# Patient Record
Sex: FEMALE | Race: BLACK OR AFRICAN AMERICAN
[De-identification: names, ages, dates, MRNs, and addresses within clinical notes are randomized per-mention and may not be internally consistent; named-entity substitution may affect disease eponyms.]

---

## 2015-08-21 VITALS — SYSTOLIC BLOOD PRESSURE: 136 MMHG | DIASTOLIC BLOOD PRESSURE: 51 MMHG

## 2017-01-13 ENCOUNTER — HOSPITAL ENCOUNTER (OUTPATIENT)
Dept: HOSPITAL 61 - MAMMO | Age: 52
Discharge: HOME | End: 2017-01-13
Attending: FAMILY MEDICINE
Payer: COMMERCIAL

## 2017-01-13 DIAGNOSIS — Z12.31: Primary | ICD-10-CM

## 2017-01-13 PROCEDURE — G0202 SCR MAMMO BI INCL CAD: HCPCS

## 2017-01-13 PROCEDURE — 77052: CPT

## 2017-01-13 NOTE — RAD
EXAM: DIGITAL SCREEN BILAT W/CAD.



HISTORY: Screening.



COMPARISON: 12/15/2008.



FINDINGS:



Digital mammography was performed. Computer-aided detection (CAD) was

utilized.



The breast parenchyma demonstrates scattered fibroglandular densities (tissue

density B). No dominant suspicious mass, suspicious microcalcifications, or

architectural distortion is identified.



Scattered, benign-appearing calcifications are present in both breasts.



IMPRESSION: No mammographic evidence of malignancy.







BI-RADS CATEGORY: 2 BENIGN FINDING(S)



RECOMMENDED FOLLOW-UP: 12M 12 MONTH FOLLOW-UP



PQRS compliance statement: Patient information was entered into a reminder

system with a target due date     for the next mammogram.



Mammography is a sensitive method for finding small breast cancers, but it

does not detect them all and is not a substitute for careful clinical

examination.  A negative mammogram does not negate a clinically suspicious

finding and should not result in delay in biopsying a clinically suspicious

abnormality.



"Our facility is accredited by the [employer] Mammography

Program."

## 2018-02-20 ENCOUNTER — HOSPITAL ENCOUNTER (EMERGENCY)
Dept: HOSPITAL 61 - ER | Age: 53
Discharge: HOME | End: 2018-02-20
Payer: COMMERCIAL
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
## 2018-02-20 DIAGNOSIS — S46.912A: Primary | ICD-10-CM

## 2018-02-20 DIAGNOSIS — I10: ICD-10-CM
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
## 2018-02-20 DIAGNOSIS — F17.200: ICD-10-CM

## 2018-02-20 DIAGNOSIS — Z96.649: ICD-10-CM

## 2018-02-20 PROCEDURE — 99283 EMERGENCY DEPT VISIT LOW MDM: CPT

## 2019-04-09 ENCOUNTER — HOSPITAL ENCOUNTER (INPATIENT)
Dept: HOSPITAL 61 - ER | Age: 54
LOS: 2 days | Discharge: HOME | DRG: 304 | End: 2019-04-11
Attending: INTERNAL MEDICINE | Admitting: INTERNAL MEDICINE
Payer: COMMERCIAL

## 2019-04-09 VITALS — DIASTOLIC BLOOD PRESSURE: 60 MMHG | SYSTOLIC BLOOD PRESSURE: 159 MMHG

## 2019-04-09 VITALS — SYSTOLIC BLOOD PRESSURE: 150 MMHG | DIASTOLIC BLOOD PRESSURE: 66 MMHG

## 2019-04-09 VITALS — DIASTOLIC BLOOD PRESSURE: 76 MMHG | SYSTOLIC BLOOD PRESSURE: 164 MMHG

## 2019-04-09 VITALS — SYSTOLIC BLOOD PRESSURE: 187 MMHG | DIASTOLIC BLOOD PRESSURE: 81 MMHG

## 2019-04-09 VITALS — BODY MASS INDEX: 39.72 KG/M2 | HEIGHT: 65 IN | WEIGHT: 238.38 LBS

## 2019-04-09 VITALS — DIASTOLIC BLOOD PRESSURE: 73 MMHG | SYSTOLIC BLOOD PRESSURE: 174 MMHG

## 2019-04-09 VITALS — DIASTOLIC BLOOD PRESSURE: 60 MMHG | SYSTOLIC BLOOD PRESSURE: 158 MMHG

## 2019-04-09 VITALS — SYSTOLIC BLOOD PRESSURE: 149 MMHG | DIASTOLIC BLOOD PRESSURE: 58 MMHG

## 2019-04-09 VITALS — DIASTOLIC BLOOD PRESSURE: 72 MMHG | SYSTOLIC BLOOD PRESSURE: 173 MMHG

## 2019-04-09 VITALS — DIASTOLIC BLOOD PRESSURE: 52 MMHG | SYSTOLIC BLOOD PRESSURE: 152 MMHG

## 2019-04-09 DIAGNOSIS — I27.20: ICD-10-CM

## 2019-04-09 DIAGNOSIS — Z82.49: ICD-10-CM

## 2019-04-09 DIAGNOSIS — Z96.641: ICD-10-CM

## 2019-04-09 DIAGNOSIS — E66.9: ICD-10-CM

## 2019-04-09 DIAGNOSIS — M54.5: ICD-10-CM

## 2019-04-09 DIAGNOSIS — Z91.19: ICD-10-CM

## 2019-04-09 DIAGNOSIS — Z91.14: ICD-10-CM

## 2019-04-09 DIAGNOSIS — I24.8: ICD-10-CM

## 2019-04-09 DIAGNOSIS — Z87.11: ICD-10-CM

## 2019-04-09 DIAGNOSIS — I11.0: ICD-10-CM

## 2019-04-09 DIAGNOSIS — Z98.1: ICD-10-CM

## 2019-04-09 DIAGNOSIS — F17.210: ICD-10-CM

## 2019-04-09 DIAGNOSIS — M19.90: ICD-10-CM

## 2019-04-09 DIAGNOSIS — E78.5: ICD-10-CM

## 2019-04-09 DIAGNOSIS — K21.9: ICD-10-CM

## 2019-04-09 DIAGNOSIS — I50.33: ICD-10-CM

## 2019-04-09 DIAGNOSIS — G89.29: ICD-10-CM

## 2019-04-09 DIAGNOSIS — Z83.3: ICD-10-CM

## 2019-04-09 DIAGNOSIS — I16.1: Primary | ICD-10-CM

## 2019-04-09 LAB
ALBUMIN SERPL-MCNC: 3.2 G/DL (ref 3.4–5)
ALBUMIN/GLOB SERPL: 0.9 {RATIO} (ref 1–1.7)
ALP SERPL-CCNC: 99 U/L (ref 46–116)
ALT SERPL-CCNC: 47 U/L (ref 14–59)
AMPHETAMINE/METHAMPHETAMINE: (no result)
ANION GAP SERPL CALC-SCNC: 9 MMOL/L (ref 6–14)
APTT PPP: YELLOW S
AST SERPL-CCNC: 27 U/L (ref 15–37)
BACTERIA #/AREA URNS HPF: 0 /HPF
BARBITURATES UR-MCNC: (no result) UG/ML
BASOPHILS # BLD AUTO: 0.1 X10^3/UL (ref 0–0.2)
BASOPHILS NFR BLD: 1 % (ref 0–3)
BENZODIAZ UR-MCNC: (no result) UG/L
BILIRUB SERPL-MCNC: 0.8 MG/DL (ref 0.2–1)
BILIRUB UR QL STRIP: NEGATIVE
BUN SERPL-MCNC: 17 MG/DL (ref 7–20)
BUN/CREAT SERPL: 15 (ref 6–20)
CALCIUM SERPL-MCNC: 8.8 MG/DL (ref 8.5–10.1)
CANNABINOIDS UR-MCNC: (no result) UG/L
CHLORIDE SERPL-SCNC: 104 MMOL/L (ref 98–107)
CHOLEST SERPL-MCNC: 164 MG/DL (ref 0–200)
CHOLEST/HDLC SERPL: 3.3 {RATIO}
CK SERPL-CCNC: 214 U/L (ref 26–192)
CO2 SERPL-SCNC: 28 MMOL/L (ref 21–32)
COCAINE UR-MCNC: (no result) NG/ML
CREAT SERPL-MCNC: 1.1 MG/DL (ref 0.6–1)
EOSINOPHIL NFR BLD: 0.3 X10^3/UL (ref 0–0.7)
EOSINOPHIL NFR BLD: 5 % (ref 0–3)
ERYTHROCYTE [DISTWIDTH] IN BLOOD BY AUTOMATED COUNT: 14.7 % (ref 11.5–14.5)
FIBRINOGEN PPP-MCNC: CLEAR MG/DL
GFR SERPLBLD BASED ON 1.73 SQ M-ARVRAT: 62.6 ML/MIN
GLOBULIN SER-MCNC: 3.7 G/DL (ref 2.2–3.8)
GLUCOSE SERPL-MCNC: 100 MG/DL (ref 70–99)
HCT VFR BLD CALC: 43.3 % (ref 36–47)
HDLC SERPL-MCNC: 49 MG/DL (ref 40–60)
HGB BLD-MCNC: 14.3 G/DL (ref 12–15.5)
LDLC: 106 MG/DL (ref 0–100)
LYMPHOCYTES # BLD: 1.3 X10^3/UL (ref 1–4.8)
LYMPHOCYTES NFR BLD AUTO: 18 % (ref 24–48)
MCH RBC QN AUTO: 31 PG (ref 25–35)
MCHC RBC AUTO-ENTMCNC: 33 G/DL (ref 31–37)
MCV RBC AUTO: 93 FL (ref 79–100)
METHADONE SERPL-MCNC: (no result) NG/ML
MONO #: 0.7 X10^3/UL (ref 0–1.1)
MONOCYTES NFR BLD: 10 % (ref 0–9)
NEUT #: 4.6 X10^3UL (ref 1.8–7.7)
NEUTROPHILS NFR BLD AUTO: 66 % (ref 31–73)
NITRITE UR QL STRIP: NEGATIVE
OPIATES UR-MCNC: (no result) NG/ML
PCP SERPL-MCNC: (no result) MG/DL
PH UR STRIP: 6.5 [PH]
PLATELET # BLD AUTO: 184 X10^3/UL (ref 140–400)
POTASSIUM SERPL-SCNC: 4 MMOL/L (ref 3.5–5.1)
PROT SERPL-MCNC: 6.9 G/DL (ref 6.4–8.2)
PROT UR STRIP-MCNC: NEGATIVE MG/DL
PROTHROMBIN TIME: 12.9 SEC (ref 11.7–14)
RBC # BLD AUTO: 4.64 X10^6/UL (ref 3.5–5.4)
RBC #/AREA URNS HPF: (no result) /HPF (ref 0–2)
SODIUM SERPL-SCNC: 141 MMOL/L (ref 136–145)
SQUAMOUS #/AREA URNS LPF: (no result) /LPF
TRIGL SERPL-MCNC: 46 MG/DL (ref 0–150)
UROBILINOGEN UR-MCNC: 0.2 MG/DL
VLDLC: 9 MG/DL (ref 0–40)
WBC # BLD AUTO: 6.9 X10^3/UL (ref 4–11)
WBC #/AREA URNS HPF: (no result) /HPF (ref 0–4)

## 2019-04-09 PROCEDURE — 94640 AIRWAY INHALATION TREATMENT: CPT

## 2019-04-09 PROCEDURE — 80307 DRUG TEST PRSMV CHEM ANLYZR: CPT

## 2019-04-09 PROCEDURE — 80048 BASIC METABOLIC PNL TOTAL CA: CPT

## 2019-04-09 PROCEDURE — 84484 ASSAY OF TROPONIN QUANT: CPT

## 2019-04-09 PROCEDURE — 71045 X-RAY EXAM CHEST 1 VIEW: CPT

## 2019-04-09 PROCEDURE — 85610 PROTHROMBIN TIME: CPT

## 2019-04-09 PROCEDURE — 85025 COMPLETE CBC W/AUTO DIFF WBC: CPT

## 2019-04-09 PROCEDURE — 76770 US EXAM ABDO BACK WALL COMP: CPT

## 2019-04-09 PROCEDURE — 85379 FIBRIN DEGRADATION QUANT: CPT

## 2019-04-09 PROCEDURE — 93306 TTE W/DOPPLER COMPLETE: CPT

## 2019-04-09 PROCEDURE — 80053 COMPREHEN METABOLIC PANEL: CPT

## 2019-04-09 PROCEDURE — 83605 ASSAY OF LACTIC ACID: CPT

## 2019-04-09 PROCEDURE — 96375 TX/PRO/DX INJ NEW DRUG ADDON: CPT

## 2019-04-09 PROCEDURE — 96374 THER/PROPH/DIAG INJ IV PUSH: CPT

## 2019-04-09 PROCEDURE — 87641 MR-STAPH DNA AMP PROBE: CPT

## 2019-04-09 PROCEDURE — 87040 BLOOD CULTURE FOR BACTERIA: CPT

## 2019-04-09 PROCEDURE — 81001 URINALYSIS AUTO W/SCOPE: CPT

## 2019-04-09 PROCEDURE — 96361 HYDRATE IV INFUSION ADD-ON: CPT

## 2019-04-09 PROCEDURE — 82550 ASSAY OF CK (CPK): CPT

## 2019-04-09 PROCEDURE — 83880 ASSAY OF NATRIURETIC PEPTIDE: CPT

## 2019-04-09 PROCEDURE — 96376 TX/PRO/DX INJ SAME DRUG ADON: CPT

## 2019-04-09 PROCEDURE — 80061 LIPID PANEL: CPT

## 2019-04-09 PROCEDURE — 36415 COLL VENOUS BLD VENIPUNCTURE: CPT

## 2019-04-09 PROCEDURE — 93005 ELECTROCARDIOGRAM TRACING: CPT

## 2019-04-09 PROCEDURE — 84443 ASSAY THYROID STIM HORMONE: CPT

## 2019-04-09 RX ADMIN — BACITRACIN PRN MLS/HR: 5000 INJECTION, POWDER, FOR SOLUTION INTRAMUSCULAR at 13:39

## 2019-04-09 RX ADMIN — BACITRACIN PRN MLS/HR: 5000 INJECTION, POWDER, FOR SOLUTION INTRAMUSCULAR at 18:15

## 2019-04-09 RX ADMIN — CHLORTHALIDONE SCH MG: 25 TABLET ORAL at 14:58

## 2019-04-09 RX ADMIN — ONDANSETRON PRN MG: 2 INJECTION INTRAMUSCULAR; INTRAVENOUS at 14:59

## 2019-04-09 RX ADMIN — ASPIRIN SCH MG: 325 TABLET, DELAYED RELEASE ORAL at 14:58

## 2019-04-09 RX ADMIN — BACITRACIN PRN MLS/HR: 5000 INJECTION, POWDER, FOR SOLUTION INTRAMUSCULAR at 19:29

## 2019-04-09 NOTE — PDOC1
History and Physical


Date of Admission


Date of Admission


DATE: 4/9/19 


TIME: 14:15





Identification/Chief Complaint


Chief Complaint


Headache, intermittent chest pain, SOA





Source


Source:  Caregiver, Chart review, Patient





History of Present Illness


History of Present Illness


43-year-old obese  female who does not see a PCP regularly, admits to 

not being compliant with her combo diuretic/ARB pill. Comes in because of 

intermittent SOA, left-sided chest pain and headache for about 2 weeks. Blood 

pressure is 240/130 on arrival. Give hydralazine but no resolve,  now on 

Cardene drip with blood pressure 200s. Mild troponin 0.153 with a BNP of 2166. 

Eyes are flushed, still looks uncomfortable headache and neck pain. We'll admit 

to ICU for hypertensive emergency. Seen at ER, dw family my plan.





SHe is not taking gabapentin anymore-she had that then she had neck surgery


She is not taking warfarin anymore-she was put on it after an orthopedic surgery





Past Medical History


Cardiovascular:  HTN, Hyperlipidemia


Pulmonary:  No pertinent hx


GI:  Peptic Ulcer disease


Heme/Onc:  No pertinent hx


Hepatobiliary:  No pertinent hx


Psych:  No pertinent hx


Musculoskeletal:  Osteoarthritis


Rheumatologic:  No pertinent hx


Infectious disease:  No pertinent hx


Renal/:  No pertinent hx


Endocrine:  No pertinent hx





Past Surgical History


Past Surgical History:  Other (neck sx)





Family History


Family History:  Diabetes, Heart Disease





Social History


Smoke:  <1 pack per day


ALCOHOL:  none


Drugs:  None





Current Problem List


Problem List


Problems


Medical Problems:


(1) CHF (congestive heart failure)


Status: Acute  





(2) Elevated troponin I level


Status: Acute  





(3) Malignant hypertension


Status: Acute  





(4) Shortness of breath


Status: Acute  











Current Medications


Current Medications





Current Medications


Sodium Chloride 1,000 ml @  1,000 mls/hr Q1H IV  Last administered on 4/9/19at 

12:25;  Start 4/9/19 at 11:45;  Stop 4/9/19 at 12:44;  Status DC


Albuterol/ Ipratropium (Duoneb) 3 ml 1X  ONCE NEB  Last administered on 4/9/ 19at 12:37;  Start 4/9/19 at 12:15;  Stop 4/9/19 at 12:16;  Status DC


Methylprednisolone Sodium Succinate (SOLU-Medrol 125MG VIAL) 150 mg 1X  ONCE IV

  Last administered on 4/9/19at 12:23;  Start 4/9/19 at 12:15;  Stop 4/9/19 at 

12:24;  Status DC


Hydralazine HCl (Apresoline Inj) 10 mg 1X  ONCE IVP  Last administered on 4/9/ 19at 12:24;  Start 4/9/19 at 12:15;  Stop 4/9/19 at 12:16;  Status DC


Methylprednisolone Sodium Succinate (SOLU-Medrol 125MG VIAL) 125 mg 1X  ONCE IV 

;  Start 4/9/19 at 12:30;  Stop 4/9/19 at 12:31;  Status DC


Hydralazine HCl (Apresoline Inj) 10 mg 1X  ONCE IVP  Last administered on 4/9/ 19at 13:36;  Start 4/9/19 at 13:30;  Stop 4/9/19 at 13:31;  Status DC


Nicardipine HCl 50 mg/Sodium Chloride 250 ml @  25 mls/hr CONT  PRN IV SEE I/O 

RECORD Last administered on 4/9/19at 13:39;  Start 4/9/19 at 13:30


Ferrous Sulfate (Feosol) 325 mg BID PO ;  Start 4/9/19 at 21:00


Gabapentin (Neurontin) 300 mg TID PO ;  Start 4/9/19 at 15:00


Oxycodone/ Acetaminophen (Percocet 7.5/ 325) 1 tab PRN Q4HRS  PRN PO pain relief

;  Start 4/9/19 at 14:00


Non-Formulary Medication (Azilsartan Med/ Chlorthalidone (Edarbyclor 40-25 Mg 

Tablet)) 1 each DAILY PO ;  Start 4/10/19 at 09:00;  Status UNV


Cyclobenzaprine HCl (Flexeril) 5 mg QHS PO ;  Start 4/9/19 at 21:00


Multivitamins (Thera M Plus) 1 tab DAILY PO ;  Start 4/9/19 at 15:00


Non-Formulary Medication (Warfarin Sodium (Coumadin)) 1 tab DAILY PO ;  Start 4/

10/19 at 09:00;  Status UNV


Lisinopril (Prinivil) 10 mg DAILY PO ;  Start 4/9/19 at 15:00;  Stop 4/9/19 at 

15:00;  Status DC


Hydrochlorothiazide (Hydrodiuril) 25 mg DAILY PO ;  Start 4/9/19 at 15:00


Aspirin (Ecotrin) 325 mg DAILYWBKFT PO ;  Start 4/9/19 at 15:00


Losartan Potassium (Cozaar) 50 mg DAILY PO ;  Start 4/9/19 at 15:00


Chlorthalidone (Thalitone) 25 mg DAILY PO ;  Start 4/9/19 at 15:00





Active Scripts


Active


Cyclobenzaprine Hcl 5 Mg Tablet 1 Tab PO QHS


Neurontin ** (Gabapentin) 300 Mg Capsule 300 Mg PO TID


Reported


Percocet 7.5-325 Mg Tablet ** (Oxycodone/Acetaminophen) 1 Each Tablet 1-2 Tab 

PO PRN Q4HRS PRN


     LAST DOSE GIVEN:


     DATE:8-21-15


     TIME:12:30 p.m.


     NEXT DOSE DUE:


     DATE:8-21-15


     TIME:4:30 p.m. if needed for pain


Coumadin (Warfarin Sodium) 5 Mg Tablet 1 Tab PO DAILY


Ferrous Sulfate 325 Mg Tablet 1 Tab PO BID


Multivitamins (Multivitamin) 1 Each Tablet 1 Each PO DAILY


Edarbyclor 40-25 Mg Tablet (Azilsartan/Chlorthalidone) 1 Each Tablet 1 Each PO 

DAILY





Allergies


Allergies:  


Coded Allergies:  


     No Known Drug Allergies (Unverified , 8/18/15)





ROS


Review of System


As per history of present illness, the rest of ROS 14 point negative





Physical Exam


General:  Alert, Oriented X3, Cooperative, No acute distress, Other (looks 

uncomfortable)


HEENT:  Atraumatic, EOMI, Other (eyes are flushed)


Lungs:  Clear to auscultation, Normal air movement


Heart:  S1S2, RRR, no thrills, no rubs, no gallops, no murmurs


Cardiovascular:  S1, S2


Breasts:  Normal, Rt breast nml w/o mass, Lt breast nml w/o mass, Nipples normal


Abdomen:  Normal bowel sounds, Soft, No tenderness, No hepatosplenomegaly, No 

masses


Rectal Exam:  not examined


PELVIC:  Nml ext genitalia


Extremities:  No clubbing, No cyanosis, No edema, Normal pulses, No tenderness/

swelling


Skin:  No rashes, No breakdown, No significant lesion


Neuro:  Normal gait, Normal speech, Strength at 5/5 X4 ext, Normal tone, 

Sensation intact, Cranial nerves 3-12 NL, Reflexes 2+


Psych/Mental Status:  Mental status NL, Mood NL





Vitals


Vitals





Vital Signs








  Date Time  Temp Pulse Resp B/P (MAP) Pulse Ox O2 Delivery O2 Flow Rate FiO2


 


4/9/19 13:51  48 27 199/82 (121) 99 Nasal Cannula 3.0 


 


4/9/19 11:26 98.8       





 98.8       











Labs


Labs





Laboratory Tests








Test


 4/9/19


11:50


 


White Blood Count


 6.9 x10^3/uL


(4.0-11.0)


 


Red Blood Count


 4.64 x10^6/uL


(3.50-5.40)


 


Hemoglobin


 14.3 g/dL


(12.0-15.5)


 


Hematocrit


 43.3 %


(36.0-47.0)


 


Mean Corpuscular Volume 93 fL () 


 


Mean Corpuscular Hemoglobin 31 pg (25-35) 


 


Mean Corpuscular Hemoglobin


Concent 33 g/dL


(31-37)


 


Red Cell Distribution Width


 14.7 %


(11.5-14.5)


 


Platelet Count


 184 x10^3/uL


(140-400)


 


Neutrophils (%) (Auto) 66 % (31-73) 


 


Lymphocytes (%) (Auto) 18 % (24-48) 


 


Monocytes (%) (Auto) 10 % (0-9) 


 


Eosinophils (%) (Auto) 5 % (0-3) 


 


Basophils (%) (Auto) 1 % (0-3) 


 


Neutrophils # (Auto)


 4.6 x10^3uL


(1.8-7.7)


 


Lymphocytes # (Auto)


 1.3 x10^3/uL


(1.0-4.8)


 


Monocytes # (Auto)


 0.7 x10^3/uL


(0.0-1.1)


 


Eosinophils # (Auto)


 0.3 x10^3/uL


(0.0-0.7)


 


Basophils # (Auto)


 0.1 x10^3/uL


(0.0-0.2)


 


D-Dimer (Elizabeth)


 0.65 ug/mlFEU


(0.00-0.50)


 


Sodium Level


 141 mmol/L


(136-145)


 


Potassium Level


 4.0 mmol/L


(3.5-5.1)


 


Chloride Level


 104 mmol/L


()


 


Carbon Dioxide Level


 28 mmol/L


(21-32)


 


Anion Gap 9 (6-14) 


 


Blood Urea Nitrogen


 17 mg/dL


(7-20)


 


Creatinine


 1.1 mg/dL


(0.6-1.0)


 


Estimated GFR


(Cockcroft-Gault) 62.6 





 


BUN/Creatinine Ratio 15 (6-20) 


 


Glucose Level


 100 mg/dL


(70-99)


 


Lactic Acid Level


 0.8 mmol/L


(0.4-2.0)


 


Calcium Level


 8.8 mg/dL


(8.5-10.1)


 


Total Bilirubin


 0.8 mg/dL


(0.2-1.0)


 


Aspartate Amino Transf


(AST/SGOT) 27 U/L (15-37) 





 


Alanine Aminotransferase


(ALT/SGPT) 47 U/L (14-59) 





 


Alkaline Phosphatase


 99 U/L


()


 


Creatine Kinase


 214 U/L


()


 


Troponin I Quantitative


 0.153 ng/mL


(0.000-0.055)


 


NT-Pro-B-Type Natriuretic


Peptide 2166 pg/mL


(0-124)


 


Total Protein


 6.9 g/dL


(6.4-8.2)


 


Albumin


 3.2 g/dL


(3.4-5.0)


 


Albumin/Globulin Ratio 0.9 (1.0-1.7) 








Laboratory Tests








Test


 4/9/19


11:50


 


White Blood Count


 6.9 x10^3/uL


(4.0-11.0)


 


Red Blood Count


 4.64 x10^6/uL


(3.50-5.40)


 


Hemoglobin


 14.3 g/dL


(12.0-15.5)


 


Hematocrit


 43.3 %


(36.0-47.0)


 


Mean Corpuscular Volume 93 fL () 


 


Mean Corpuscular Hemoglobin 31 pg (25-35) 


 


Mean Corpuscular Hemoglobin


Concent 33 g/dL


(31-37)


 


Red Cell Distribution Width


 14.7 %


(11.5-14.5)


 


Platelet Count


 184 x10^3/uL


(140-400)


 


Neutrophils (%) (Auto) 66 % (31-73) 


 


Lymphocytes (%) (Auto) 18 % (24-48) 


 


Monocytes (%) (Auto) 10 % (0-9) 


 


Eosinophils (%) (Auto) 5 % (0-3) 


 


Basophils (%) (Auto) 1 % (0-3) 


 


Neutrophils # (Auto)


 4.6 x10^3uL


(1.8-7.7)


 


Lymphocytes # (Auto)


 1.3 x10^3/uL


(1.0-4.8)


 


Monocytes # (Auto)


 0.7 x10^3/uL


(0.0-1.1)


 


Eosinophils # (Auto)


 0.3 x10^3/uL


(0.0-0.7)


 


Basophils # (Auto)


 0.1 x10^3/uL


(0.0-0.2)


 


D-Dimer (Elizabeth)


 0.65 ug/mlFEU


(0.00-0.50)


 


Sodium Level


 141 mmol/L


(136-145)


 


Potassium Level


 4.0 mmol/L


(3.5-5.1)


 


Chloride Level


 104 mmol/L


()


 


Carbon Dioxide Level


 28 mmol/L


(21-32)


 


Anion Gap 9 (6-14) 


 


Blood Urea Nitrogen


 17 mg/dL


(7-20)


 


Creatinine


 1.1 mg/dL


(0.6-1.0)


 


Estimated GFR


(Cockcroft-Gault) 62.6 





 


BUN/Creatinine Ratio 15 (6-20) 


 


Glucose Level


 100 mg/dL


(70-99)


 


Lactic Acid Level


 0.8 mmol/L


(0.4-2.0)


 


Calcium Level


 8.8 mg/dL


(8.5-10.1)


 


Total Bilirubin


 0.8 mg/dL


(0.2-1.0)


 


Aspartate Amino Transf


(AST/SGOT) 27 U/L (15-37) 





 


Alanine Aminotransferase


(ALT/SGPT) 47 U/L (14-59) 





 


Alkaline Phosphatase


 99 U/L


()


 


Creatine Kinase


 214 U/L


()


 


Troponin I Quantitative


 0.153 ng/mL


(0.000-0.055)


 


NT-Pro-B-Type Natriuretic


Peptide 2166 pg/mL


(0-124)


 


Total Protein


 6.9 g/dL


(6.4-8.2)


 


Albumin


 3.2 g/dL


(3.4-5.0)


 


Albumin/Globulin Ratio 0.9 (1.0-1.7) 











VTE Prophylaxis Ordered


VTE Prophylaxis Devices:  Yes


VTE Pharmacological Prophylaxi:  Yes





Assessment/Plan


Assessment/Plan


Hypertensive emergency


Obesity, BMI 42


Noncompliance


History neck surgery-off gabapentin and flexeril


History of orthopedic surgery-off warfarin





Plan: Admit 2 MN


CArdene gtt


cards consult


ICU


Check lipids


I did start lisinopril HCTZ combo pill for tomorrow


Full code


Seen at ER discussed with family


Further recs pending course


Other supprotive meds


CC30











BEN FALL MD Apr 9, 2019 14:19

## 2019-04-09 NOTE — PDOC2
RUIZ ARROYO APRN 4/9/19 1551:


CARDIAC CONSULT


DATE OF CONSULT


Date of Consult


DATE: 4/9/19 


TIME: 15:42





REASON FOR CONSULT


Reason for Consult:


HTN,SOA





REFERRING PHYSICIAN


Referring Physician:


Juice





SOURCE


Source:  Chart review, Patient





HISTORY OF PRESENT ILLNESS


HISTORY OF PRESENT ILLNESS


This is a pleasant 54 yo female admitted for complains of SOA and not feeling 

well.  Reports that in the last 2 weeks she has becoming more SOA, Her legs 

have become swollen.  No chest pain per se but at times has some tingling to 

her left hand.  No jaw tightness but has been seeing spots at times but HA.  

Positive for orthopnea, feeling bloated.  Positive for PND.  She has been 

skipping her BP meds at times.  Positive for KEARNS but no exertional chest pain.  

Felt at times her chest is pounding. she does not see any outpt cardiologist.  

No ETOH or recreational drug use but chronic tobacco user.  Reports high dose 

routine use of NSAID, consumption of soda, processed food and has gained some 

wt in the last 6 months at least about 20 pounds. She may have been told of WANDA 

in the past but no recollection of her being told of CPAP need. No past hx of 

CAD, arrhythmias. No passing out, falls , any recent infection or any recent 

injury.





PAST MEDICAL HISTORY


Cardiovascular:  HTN, Hyperlipidemia, Valve insufficiency, Pulmonary 

hypertension


Pulmonary:  No pertinent hx


CENTRAL NERVOUS SYSTEM:  Other (None)


GI:  GERD, Peptic Ulcer disease


Heme/Onc:  No pertinent hx


Hepatobiliary:  No pertinent hx


Psych:  No pertinent hx


Musculoskeletal:   low back pain, Osteoarthritis, Other (lumbar and cervical 

stenosis)


Rheumatologic:  No pertinent hx


Infectious disease:  No pertinent hx


ENT:  No pertinent hx


Renal/:  No pertinent hx


Endocrine:  No pertinent hx


Dermatology:  No pertinent hx





PAST SURGICAL HISTORY


Past Surgical History:  Total hip replacement (right), Other (cervical fusion)





FAMILY HISTORY


Family History


 Diabetes (father), Heart Disease (mother; sister with cardiac dysrhythmia and 

ICD)





SOCIAL HISTORY


Smoke:  1 pack per day (>30 yrs)


ALCOHOL:  none


Drugs:  None


Lives:  with Family





CURRENT MEDICATIONS


CURRENT MEDICATIONS





Current Medications








 Medications


  (Trade)  Dose


 Ordered  Sig/Phu


 Route


 PRN Reason  Start Time


 Stop Time Status Last Admin


Dose Admin


 


 Sodium Chloride  1,000 ml @ 


 1,000 mls/hr  Q1H


 IV


   4/9/19 11:45


 4/9/19 12:44 DC 4/9/19 12:25





 


 Albuterol/


 Ipratropium


  (Duoneb)  3 ml  1X  ONCE


 NEB


   4/9/19 12:15


 4/9/19 12:16 DC 4/9/19 12:37





 


 Methylprednisolone


 Sodium Succinate


  (SOLU-Medrol


 125MG VIAL)  150 mg  1X  ONCE


 IV


   4/9/19 12:15


 4/9/19 12:24 DC 4/9/19 12:23





 


 Hydralazine HCl


  (Apresoline Inj)  10 mg  1X  ONCE


 IVP


   4/9/19 12:15


 4/9/19 12:16 DC 4/9/19 12:24





 


 Hydralazine HCl


  (Apresoline Inj)  10 mg  1X  ONCE


 IVP


   4/9/19 13:30


 4/9/19 13:31 DC 4/9/19 13:36





 


 Nicardipine HCl


 50 mg/Sodium


 Chloride  250 ml @ 


 25 mls/hr  CONT  PRN


 IV


 SEE I/O RECORD  4/9/19 13:30


    4/9/19 13:39





 


 Multivitamins


  (Thera M Plus)  1 tab  DAILY


 PO


   4/9/19 15:00


    4/9/19 14:58





 


 Hydrochlorothiazide


  (Hydrodiuril)  25 mg  DAILY


 PO


   4/9/19 15:00


 4/9/19 15:34 DC 4/9/19 14:59





 


 Aspirin


  (Ecotrin)  325 mg  DAILYWBKFT


 PO


   4/9/19 15:00


    4/9/19 14:58





 


 Chlorthalidone


  (Thalitone)  25 mg  DAILY


 PO


   4/9/19 15:00


    4/9/19 14:58





 


 Ondansetron HCl


  (Zofran)  4 mg  PRN Q6HRS  PRN


 IV


 NAUSEA/VOMITING  4/9/19 14:45


    4/9/19 14:59














ALLERGIES


ALLERGIES:  


Coded Allergies:  


     No Known Drug Allergies (Unverified , 8/18/15)





ROS


Review of System


14 point ROS evaluated with pertinent positives noted per HPI





PHYSICAL EXAM


General:  Alert, Oriented X3, Cooperative, No acute distress


HEENT:  Atraumatic, Mucous membr. moist/pink


Lungs:  Other (diminished bases)


Heart:  Regular rate (SR), Other (3/6 systolic murmur to LLS border; S4)


Extremities:  No cyanosis, Other (3+ bilateral LE pitting edema)


Skin:  No breakdown, No significant lesion


Neuro:  Normal speech, Sensation intact


Psych/Mental Status:  Mental status NL, Mood NL


MUSCULOSKELETAL:  Osteoarthritic changes both hands





VITALS


VITALS





Vital Signs








  Date Time  Temp Pulse Resp B/P (MAP) Pulse Ox O2 Delivery O2 Flow Rate FiO2


 


4/9/19 15:00  54 22 173/72 (105) 94 Nasal Cannula 2.0 


 


4/9/19 14:45 98.1       





 98.1       











LABS


Lab:





Laboratory Tests








Test


 4/9/19


11:50


 


White Blood Count


 6.9 x10^3/uL


(4.0-11.0)


 


Red Blood Count


 4.64 x10^6/uL


(3.50-5.40)


 


Hemoglobin


 14.3 g/dL


(12.0-15.5)


 


Hematocrit


 43.3 %


(36.0-47.0)


 


Mean Corpuscular Volume 93 fL () 


 


Mean Corpuscular Hemoglobin 31 pg (25-35) 


 


Mean Corpuscular Hemoglobin


Concent 33 g/dL


(31-37)


 


Red Cell Distribution Width


 14.7 %


(11.5-14.5)


 


Platelet Count


 184 x10^3/uL


(140-400)


 


Neutrophils (%) (Auto) 66 % (31-73) 


 


Lymphocytes (%) (Auto) 18 % (24-48) 


 


Monocytes (%) (Auto) 10 % (0-9) 


 


Eosinophils (%) (Auto) 5 % (0-3) 


 


Basophils (%) (Auto) 1 % (0-3) 


 


Neutrophils # (Auto)


 4.6 x10^3uL


(1.8-7.7)


 


Lymphocytes # (Auto)


 1.3 x10^3/uL


(1.0-4.8)


 


Monocytes # (Auto)


 0.7 x10^3/uL


(0.0-1.1)


 


Eosinophils # (Auto)


 0.3 x10^3/uL


(0.0-0.7)


 


Basophils # (Auto)


 0.1 x10^3/uL


(0.0-0.2)


 


Prothrombin Time


 12.9 SEC


(11.7-14.0)


 


Prothromb Time International


Ratio 1.0 (0.8-1.1) 





 


D-Dimer (Elizabeth)


 0.65 ug/mlFEU


(0.00-0.50)


 


Sodium Level


 141 mmol/L


(136-145)


 


Potassium Level


 4.0 mmol/L


(3.5-5.1)


 


Chloride Level


 104 mmol/L


()


 


Carbon Dioxide Level


 28 mmol/L


(21-32)


 


Anion Gap 9 (6-14) 


 


Blood Urea Nitrogen


 17 mg/dL


(7-20)


 


Creatinine


 1.1 mg/dL


(0.6-1.0)


 


Estimated GFR


(Cockcroft-Gault) 62.6 





 


BUN/Creatinine Ratio 15 (6-20) 


 


Glucose Level


 100 mg/dL


(70-99)


 


Lactic Acid Level


 0.8 mmol/L


(0.4-2.0)


 


Calcium Level


 8.8 mg/dL


(8.5-10.1)


 


Total Bilirubin


 0.8 mg/dL


(0.2-1.0)


 


Aspartate Amino Transf


(AST/SGOT) 27 U/L (15-37) 





 


Alanine Aminotransferase


(ALT/SGPT) 47 U/L (14-59) 





 


Alkaline Phosphatase


 99 U/L


()


 


Creatine Kinase


 214 U/L


()


 


Troponin I Quantitative


 0.153 ng/mL


(0.000-0.055)


 


NT-Pro-B-Type Natriuretic


Peptide 2166 pg/mL


(0-124)


 


Total Protein


 6.9 g/dL


(6.4-8.2)


 


Albumin


 3.2 g/dL


(3.4-5.0)


 


Albumin/Globulin Ratio 0.9 (1.0-1.7) 


 


Triglycerides Level


 46 mg/dL


(0-150)


 


Cholesterol Level


 164 mg/dL


(0-200)


 


LDL Cholesterol, Calculated


 106 mg/dL


(0-100)


 


VLDL Cholesterol, Calculated 9 mg/dL (0-40) 


 


Non-HDL Cholesterol Calculated


 115 mg/dL


(0-129)


 


HDL Cholesterol


 49 mg/dL


(40-60)


 


Cholesterol/HDL Ratio 3.3 











ECHOCARDIOGRAM


ECHOCARDIOGRAM





<Conclusion>


The left ventricle is normal size.


The left ventricular systolic function is normal and the ejection fraction is 

within normal range.


The Ejection Fraction is 55-60%.


There is borderline concentric left ventricular hypertrophy.


The interatrial septum is intact with no evidence for an atrial septal defect 

or patent foramen ovale as noted on 2-D or Doppler imaging.


A technically difficult bubble study showed no interatrial shunt.


There is no significant aortic valvular stenosis.


Doppler and Color Flow revealed no significant aortic regurgitation.


Doppler and Color Flow revealed mild mitral regurgitation.


Doppler and Color Flow revealed mild tricuspid regurgitation.


The PA pressure was estimated at 44 mmHg.


There is a trace pericardial effusion with no hemodynamic significance.





DATE:     05/20/15 1741





ASSESSMENT/PLAN


ASSESSMENT/PLAN


1. Malignant HTN: due to Lifestyle issues and inconsistent use of BP meds and 

NSAID use


2. Acute on chronic diastolic CHF; induced by above


3. Elevated troponin: mild at 0.15. Suspect demand mediated, type 2 as above. 

EKG SB no acute ST-T wave changes, no CP


4. Tobaccoism


5. HLP


6. Obesity


7. Sinus bradycardia: suspect reflexively induced by high BP, no pauses and no 

syncopal nor presyncopal event at home 


8. Chronic high dose NSAID use with chronic low back pain. 6 tabs ibuprofen and 

6 tabs of aleve a day avg. 





Recommendations


1. No AV robbie blocking agents. Agree with cardene. Will start titrating down 

per BP trend. Start on amlodipine. Hydralazine IV PRN


2. Restart home meds.  ARB to sub losartan at 100 mg and continue 

chlorthalidone. 


3. No IV lasix given but good UOP with HCTZ and chlorthalidone given. discussed 

med compliance. will reeval tomorow if any need for loop diuretic is warranted. 


4. TTE, TSH, UDS. Trend troponin


5. Smoking cessation


6. Discussed lifestyle modification. Wt loss, minimizing processed food, 

exercise, DASH diet, decreaseing soda. Stop routine NSAID and only do PRN


7. Will need outpt WANDA workup. 


8. Start on PPI. 


9. Encourage to see ophthalmology routine appt. 


10. Given her risk factors, ischemic workup likely as an outpt in the for m of 

stress test pending current workup result.





OCDY JUAREZ MD 4/10/19 0943:


CARDIAC CONSULT


ASSESSMENT/PLAN


ASSESSMENT/PLAN


Patient seen and examined 4/9/19.  Agree with NP's assessment and plan.


Agree with starting Norvasc and titrate Cardene off as tolerated


Continue diuretics for acute on chronic diastolic heart failure 


Slight troponin elevation probably demand ischemia secondary to uncontrolled 

hypertension


2-D echo showed normal LV function without any wall motion abnormalities 


Plan ischemic evaluation as an outpatient


Thank you for your consultation











RUIZ ARROYO APRN Apr 9, 2019 15:51


CODY JUAREZ MD Apr 10, 2019 09:43

## 2019-04-09 NOTE — RAD
EXAM: Chest, single view.

 

HISTORY: Shortness of breath.

 

COMPARISON: 5/19/2015

 

FINDINGS: A frontal view of the chest obtained. There is mild diffuse 

central predominant increased interstitial opacity. There is cardiomegaly.

There is no consolidation, pleural effusion or pneumothorax. There is 

cervical spinal fusion instrumentation.

 

IMPRESSION: 

1. Mild central predominant increased interstitial opacity without cristal 

congestion.

2. Cardiomegaly.

 

Electronically signed by: Sweta Barrow MD (4/9/2019 12:05 PM) [place] [id number]

## 2019-04-09 NOTE — CARD
MR#: [id number]

Account#: [id number]

Accession#: [id number]

Date of Study: 04/09/2019

Ordering Physician: BEN FALL, 

Referring Physician: BEN FALL, 

Tech: Kristen Rodriguez





--------------- APPROVED REPORT --------------





EXAM: Two-dimensional and M-mode echocardiogram with Doppler and color Doppler.



Other Information 

Quality : AverageHR: 60bpm



INDICATION

Dyspnea 

Hypertension/HCVD

Congestive Heart Failure 



RISK FACTORS

Hyperlipidemia



2D DIMENSIONS 

RVDd4.1 (2.9-3.5cm)Left Atrium(2D)4.8 (1.6-4.0cm)

IVSd1.3 (0.7-1.1cm)Aortic Root(2D)2.5 (2.0-3.7cm)

LVDd4.9 (3.9-5.9cm)LVOT Diameter2.1 (1.8-2.4cm)

PWd1.4 (0.7-1.1cm)LVDs3.2 (2.5-4.0cm)

FS (%) 35.0 %SV72.2 ml

LVEF(%)64.1 (>50%)



Aortic Valve

AoV Peak Ajay.205.9cm/sAoV VTI45.7cm

AO Peak GR.17.0mmHgLVOT  VTI 31.78cm

AO Mean GR.13mmHg



Mitral Valve

MV E Ajtsbzdq514.7cm/sMV DECEL [id number]

MV A Vitwtjro20.0cm/sE/A  Ratio1.8



TDI

Lateral E' P. V5.75cm/sMedial E' P. V4.24cm/s

E/Lateral E'17.9E/Medial E'24.2



Tricuspid Valve

TR P. Whixmuww598ce/sRAP WQFBYTUJ80itTq

TR Peak Gr.12nhTwBMGW87ouRx



Pulmonary Vein

S1 Eqbwblqb52.9cm/sS2 Wozdxeeq04.57cm/s

D2 Odekvybp95.6cm/sPVa mbldqdaw84auno



 LEFT VENTRICLE 

The left ventricle is normal size. There is moderate concentric left ventricular hypertrophy. The Eje
ction Fraction is >55%. The left ventricular systolic function is normal and the ejection fraction is
 within normal range. There is normal LV segmental wall motion. Transmitral Doppler flow pattern is G
rade I-abnormal relaxation pattern.



 RIGHT VENTRICLE 

The right ventricle is normal size. There is normal right ventricular wall thickness. The right ventr
icular systolic function is normal.



 ATRIA 

The left atrium is mildly dilated. The right atrium is borderline dilated. The interatrial septum is 
intact with no evidence for an atrial septal defect or patent foramen ovale as noted on 2-D or Dopple
r imaging.



 AORTIC VALVE 

The aortic valve is normal in structure and function. Doppler and Color Flow revealed no significant 
aortic regurgitation. There is no significant aortic valvular stenosis.



 MITRAL VALVE 

The mitral valve is normal in structure and function. There is no evidence of mitral valve prolapse. 
There is no mitral valve stenosis. Doppler and Color-flow revealed trace mitral regurgitation.



 TRICUSPID VALVE 

The tricuspid valve is normal in structure and function. Doppler and Color Flow revealed trace tricus
pid regurgitation with an estimated PAP of 43 mmHg. There is no tricuspid valve stenosis.



 PULMONIC VALVE 

Doppler and Color Flow revealed trace pulmonic valvular regurgitation. There is no pulmonic valvular 
stenosis.



 GREAT VESSELS 

The aortic root is normal in size. The IVC is dilated and collapses <50% with inspiration.



 PERICARDIAL EFFUSION 

There is no evidence of significant pericardial effusion.



Critical Notification

Critical Value: No



<Conclusion>

The Ejection Fraction is >55%. The left ventricular systolic function is normal and the ejection frac
tion is within normal range.

There is normal LV segmental wall motion.

There is moderate concentric left ventricular hypertrophy.

Doppler and Color Flow revealed trace tricuspid regurgitation with an estimated PAP of 43 mmHg.

The IVC is dilated and collapses <50% with inspiration.



Signed by : Hira Perdomo, 

Electronically Approved : 04/09/2019 16:10:49

## 2019-04-09 NOTE — PHYS DOC
Past Medical History


Past Medical History:  Hypertension


Past Surgical History:  Hip Replacement


Additional Past Surgical Histo:  DISC REPLACEMENT


Alcohol Use:  None


Drug Use:  None





Adult General


Chief Complaint


Chief Complaint:  SHORTNESS OF BREATH





HPI


HPI





Patient is a 53  year old female who presents with complaining of shortness of 

breath. Patient complaining of intermittent episodes of shortness of breath 

with exertion for the last 2-3 weeks that getting worse today. Patient 

complaining of substernal chest pain associated with shortness of breath and 

headache and generalized weakness. Patient states he had history of 

hypertension but doesn't take his medication as instructed. She denies fever 

and chills, focal neuro deficit, nausea and vomiting, blurred vision. Patient 

had blood pressure of more than 240 at arrival to ER.





Review of Systems


Review of Systems





Constitutional: Denies fever or chills []


Eyes: Denies change in visual acuity, redness, or eye pain []


HENT: Denies nasal congestion or sore throat []


Respiratory: Denies cough, reports shortness of breath []


Cardiovascular: No additional information not addressed in HPI []


GI: Denies abdominal pain, nausea, vomiting, bloody stools or diarrhea []


: Denies dysuria or hematuria []


Musculoskeletal: Denies back pain or joint pain []


Integument: Denies rash or skin lesions []


Neurologic: Denies headache, focal weakness or sensory changes []


Endocrine: Denies polyuria or polydipsia []





All other systems were reviewed and found to be within normal limits, except as 

documented in this note.





Current Medications


Current Medications





Current Medications








 Medications


  (Trade)  Dose


 Ordered  Sig/Phu  Start Time


 Stop Time Status Last Admin


Dose Admin


 


 Albuterol/


 Ipratropium


  (Duoneb)  3 ml  1X  ONCE  19 12:15


 19 12:16 DC 19 12:37


3 ML


 


 Hydralazine HCl


  (Apresoline Inj)  10 mg  1X  ONCE  19 13:30


 19 13:31 DC 19 13:36


10 MG


 


 Methylprednisolone


 Sodium Succinate


  (SOLU-Medrol


 125MG VIAL)  125 mg  1X  ONCE  19 12:30


 19 12:31 DC  





 


 Nicardipine HCl


 50 mg/Sodium


 Chloride  250 ml @ 


 25 mls/hr  CONT  PRN  19 13:30


    19 13:39


25 MLS/HR


 


 Sodium Chloride  1,000 ml @ 


 1,000 mls/hr  Q1H  19 11:45


 19 12:44 DC 19 12:25


1,000 MLS/HR











Allergies


Allergies





Allergies








Coded Allergies Type Severity Reaction Last Updated Verified


 


  No Known Drug Allergies    8/18/15 No











Physical Exam


Physical Exam





Constitutional: Well developed, well nourished, mild distress, non-toxic 

appearance. []


HENT: Normocephalic, atraumatic.


Eyes: PERRLA, EOMI, conjunctiva normal, no discharge. [] 


Neck: Normal range of motion, no tenderness, supple, no stridor. [] 


Cardiovascular: Bradycardia, no murmur []


Lungs & Thorax:  Bilateral breath sounds clear to auscultation []


Abdomen: Bowel sounds normal, soft, no tenderness, no masses, no pulsatile 

masses. [] 


Skin: Warm, dry, no erythema, no rash. [] 


Back: No tenderness, no CVA tenderness. [] 


Extremities: No tenderness, no cyanosis, no clubbing, ROM intact, no edema. [] 


Neurologic: Alert and oriented X 3, normal motor function, normal sensory 

function, no focal deficits noted. []


Psychologic: Affect normal, judgement normal, mood normal. []





Current Patient Data


Vital Signs





 Vital Signs








  Date Time  Temp Pulse Resp B/P (MAP) Pulse Ox O2 Delivery O2 Flow Rate FiO2


 


19 13:15  50 32 228/105 (146)    


 


19 12:40     94 Room Air  


 


19 11:26 98.8       





 98.8       








Lab Values





 Laboratory Tests








Test


 19


11:50


 


White Blood Count


 6.9 x10^3/uL


(4.0-11.0)


 


Red Blood Count


 4.64 x10^6/uL


(3.50-5.40)


 


Hemoglobin


 14.3 g/dL


(12.0-15.5)


 


Hematocrit


 43.3 %


(36.0-47.0)


 


Mean Corpuscular Volume


 93 fL ()





 


Mean Corpuscular Hemoglobin 31 pg (25-35)  


 


Mean Corpuscular Hemoglobin


Concent 33 g/dL


(31-37)


 


Red Cell Distribution Width


 14.7 %


(11.5-14.5)  H


 


Platelet Count


 184 x10^3/uL


(140-400)


 


Neutrophils (%) (Auto) 66 % (31-73)  


 


Lymphocytes (%) (Auto) 18 % (24-48)  L


 


Monocytes (%) (Auto) 10 % (0-9)  H


 


Eosinophils (%) (Auto) 5 % (0-3)  H


 


Basophils (%) (Auto) 1 % (0-3)  


 


Neutrophils # (Auto)


 4.6 x10^3uL


(1.8-7.7)


 


Lymphocytes # (Auto)


 1.3 x10^3/uL


(1.0-4.8)


 


Monocytes # (Auto)


 0.7 x10^3/uL


(0.0-1.1)


 


Eosinophils # (Auto)


 0.3 x10^3/uL


(0.0-0.7)


 


Basophils # (Auto)


 0.1 x10^3/uL


(0.0-0.2)


 


Prothrombin Time


 12.9 SEC


(11.7-14.0)


 


Prothrombin Time INR 1.0 (0.8-1.1)  


 


D-Dimer (Elizabeth)


 0.65 ug/mlFEU


(0.00-0.50)  H


 


Sodium Level


 141 mmol/L


(136-145)


 


Potassium Level


 4.0 mmol/L


(3.5-5.1)


 


Chloride Level


 104 mmol/L


()


 


Carbon Dioxide Level


 28 mmol/L


(21-32)


 


Anion Gap 9 (6-14)  


 


Blood Urea Nitrogen


 17 mg/dL


(7-20)


 


Creatinine


 1.1 mg/dL


(0.6-1.0)  H


 


Estimated GFR


(Cockcroft-Gault) 62.6  





 


BUN/Creatinine Ratio 15 (6-20)  


 


Glucose Level


 100 mg/dL


(70-99)  H


 


Lactic Acid Level


 0.8 mmol/L


(0.4-2.0)


 


Calcium Level


 8.8 mg/dL


(8.5-10.1)


 


Total Bilirubin


 0.8 mg/dL


(0.2-1.0)


 


Aspartate Amino Transferase


(AST) 27 U/L (15-37)





 


Alanine Aminotransferase (ALT)


 47 U/L (14-59)





 


Alkaline Phosphatase


 99 U/L


()


 


Creatine Kinase


 214 U/L


()  H


 


Troponin I Quantitative


 0.153 ng/mL


(0.000-0.055)


 


NT-Pro-B-Type Natriuretic


Peptide 2166 pg/mL


(0-124)  H


 


Total Protein


 6.9 g/dL


(6.4-8.2)


 


Albumin


 3.2 g/dL


(3.4-5.0)  L


 


Albumin/Globulin Ratio


 0.9 (1.0-1.7)


L


 


Triglycerides Level


 46 mg/dL


(0-150)


 


Cholesterol Level


 164 mg/dL


(0-200)


 


LDL Cholesterol, Calculated


 106 mg/dL


(0-100)  H


 


VLDL Cholesterol, Calculated


 9 mg/dL (0-40)





 


Non-HDL Cholesterol Calculated


 115 mg/dL


(0-129)


 


HDL Cholesterol


 49 mg/dL


(40-60)


 


Cholesterol/HDL Ratio 3.3  


 


Thyroid Stimulating Hormone


(TSH) 0.801 uIU/mL


(0.358-3.74)





 Laboratory Tests


19 11:50








 Laboratory Tests


19 11:50














EKG


EKG


Patient interpreted by me. EKG at 1142 showed sinus bradycardia at rate of 57 

with multiple artifact, right saldivar axis, no acute ST and T-wave abnormalities,





Radiology/Procedures


Radiology/Procedures


 [facility]


 [address] [place] [address]


 [phone]


 


 IMAGING REPORT





 Signed





PATIENT: DAAR MENA ACCOUNT: [id number] MRN#: [id number]


: 1965 LOCATION: ER AGE: 53


SEX: F EXAM DT: 19 ACCESSION#: [id number]


STATUS: REG ER ORD. PHYSICIAN: MICHAEL VALDOVINOS MD 


REASON: shortness of breath


PROCEDURE: PORTABLE CHEST 1V





EXAM: Chest, single view.


 


HISTORY: Shortness of breath.


 


COMPARISON: 2015


 


FINDINGS: A frontal view of the chest obtained. There is mild diffuse 


central predominant increased interstitial opacity. There is cardiomegaly.


There is no consolidation, pleural effusion or pneumothorax. There is 


cervical spinal fusion instrumentation.


 


IMPRESSION: 


1. Mild central predominant increased interstitial opacity without cristal 


congestion.


2. Cardiomegaly.


 


Electronically signed by: Sweta Craig MD (2019 12:05 PM) [facility]














DICTATED and SIGNED BY:     SWETA CRAIG MD


DATE:     19 8742





Course & Med Decision Making


Course & Med Decision Making


Pertinent Labs and Imaging studies reviewed. (See chart for details)





Evaluation of patient in ER showed 53-year-old male patient with history of 

hypertension without taking her medication presented to ER with complaining of 

shortness of breath and chest pain and headache. Patient had blood pressure 

more than 240/140 with bradycardia at arrival to ER. Patient treated with 

hydralazine IV 10 mg 2 and coughing today because of intermittent elevation of 

blood pressure.


Patient requiring admission for further evaluation and treatment. Discussed 

with Dr. Samaniego who is in agreement with admission. Discussed findings and 

plan with patient and family, who acknowledge understanding and agreement.





Dragon Disclaimer


Dragon Disclaimer


This electronic medical record was generated, in whole or in part, using a 

voice recognition dictation system.





Departure


Departure


Impression:  


 Primary Impression:  


 Malignant hypertension


 Additional Impressions:  


 Elevated troponin I level


 CHF (congestive heart failure)


 Shortness of breath


Disposition:   ADMITTED AS INPATIENT (@1348)


Admitting Physician:  Rachel Samaniego (accepted admission at 1347)


Condition:  GUARDED


Referrals:  


MACIE FAYE MD (PCP)





Critical Care Time


 Critical care time was 60 minutes exclusive of procedures.





Problem Qualifiers











MICHAEL VALDOVINOS MD 2019 13:50

## 2019-04-10 VITALS — SYSTOLIC BLOOD PRESSURE: 168 MMHG | DIASTOLIC BLOOD PRESSURE: 76 MMHG

## 2019-04-10 VITALS — SYSTOLIC BLOOD PRESSURE: 141 MMHG | DIASTOLIC BLOOD PRESSURE: 59 MMHG

## 2019-04-10 VITALS — SYSTOLIC BLOOD PRESSURE: 145 MMHG | DIASTOLIC BLOOD PRESSURE: 60 MMHG

## 2019-04-10 VITALS — SYSTOLIC BLOOD PRESSURE: 152 MMHG | DIASTOLIC BLOOD PRESSURE: 64 MMHG

## 2019-04-10 VITALS — DIASTOLIC BLOOD PRESSURE: 60 MMHG | SYSTOLIC BLOOD PRESSURE: 153 MMHG

## 2019-04-10 VITALS — SYSTOLIC BLOOD PRESSURE: 158 MMHG | DIASTOLIC BLOOD PRESSURE: 67 MMHG

## 2019-04-10 VITALS — SYSTOLIC BLOOD PRESSURE: 150 MMHG | DIASTOLIC BLOOD PRESSURE: 67 MMHG

## 2019-04-10 VITALS — SYSTOLIC BLOOD PRESSURE: 155 MMHG | DIASTOLIC BLOOD PRESSURE: 55 MMHG

## 2019-04-10 VITALS — DIASTOLIC BLOOD PRESSURE: 72 MMHG | SYSTOLIC BLOOD PRESSURE: 146 MMHG

## 2019-04-10 VITALS — SYSTOLIC BLOOD PRESSURE: 171 MMHG | DIASTOLIC BLOOD PRESSURE: 88 MMHG

## 2019-04-10 VITALS — DIASTOLIC BLOOD PRESSURE: 50 MMHG | SYSTOLIC BLOOD PRESSURE: 147 MMHG

## 2019-04-10 VITALS — SYSTOLIC BLOOD PRESSURE: 159 MMHG | DIASTOLIC BLOOD PRESSURE: 68 MMHG

## 2019-04-10 VITALS — DIASTOLIC BLOOD PRESSURE: 58 MMHG | SYSTOLIC BLOOD PRESSURE: 147 MMHG

## 2019-04-10 VITALS — DIASTOLIC BLOOD PRESSURE: 67 MMHG | SYSTOLIC BLOOD PRESSURE: 161 MMHG

## 2019-04-10 VITALS — SYSTOLIC BLOOD PRESSURE: 143 MMHG | DIASTOLIC BLOOD PRESSURE: 61 MMHG

## 2019-04-10 VITALS — DIASTOLIC BLOOD PRESSURE: 63 MMHG | SYSTOLIC BLOOD PRESSURE: 144 MMHG

## 2019-04-10 VITALS — DIASTOLIC BLOOD PRESSURE: 80 MMHG | SYSTOLIC BLOOD PRESSURE: 188 MMHG

## 2019-04-10 VITALS — DIASTOLIC BLOOD PRESSURE: 60 MMHG | SYSTOLIC BLOOD PRESSURE: 146 MMHG

## 2019-04-10 VITALS — SYSTOLIC BLOOD PRESSURE: 142 MMHG | DIASTOLIC BLOOD PRESSURE: 60 MMHG

## 2019-04-10 VITALS — DIASTOLIC BLOOD PRESSURE: 56 MMHG | SYSTOLIC BLOOD PRESSURE: 151 MMHG

## 2019-04-10 VITALS — DIASTOLIC BLOOD PRESSURE: 62 MMHG | SYSTOLIC BLOOD PRESSURE: 158 MMHG

## 2019-04-10 VITALS — DIASTOLIC BLOOD PRESSURE: 59 MMHG | SYSTOLIC BLOOD PRESSURE: 153 MMHG

## 2019-04-10 LAB
ANION GAP SERPL CALC-SCNC: 7 MMOL/L (ref 6–14)
BUN SERPL-MCNC: 17 MG/DL (ref 7–20)
CALCIUM SERPL-MCNC: 8.8 MG/DL (ref 8.5–10.1)
CHLORIDE SERPL-SCNC: 105 MMOL/L (ref 98–107)
CO2 SERPL-SCNC: 29 MMOL/L (ref 21–32)
CREAT SERPL-MCNC: 1.1 MG/DL (ref 0.6–1)
GFR SERPLBLD BASED ON 1.73 SQ M-ARVRAT: 62.6 ML/MIN
GLUCOSE SERPL-MCNC: 142 MG/DL (ref 70–99)
POTASSIUM SERPL-SCNC: 4.4 MMOL/L (ref 3.5–5.1)
SODIUM SERPL-SCNC: 141 MMOL/L (ref 136–145)

## 2019-04-10 RX ADMIN — LOSARTAN POTASSIUM SCH MG: 50 TABLET ORAL at 09:13

## 2019-04-10 RX ADMIN — NICOTINE PRN PATCH: 21 PATCH, EXTENDED RELEASE TOPICAL at 09:12

## 2019-04-10 RX ADMIN — PANTOPRAZOLE SODIUM SCH MG: 40 TABLET, DELAYED RELEASE ORAL at 09:14

## 2019-04-10 RX ADMIN — HYDRALAZINE HYDROCHLORIDE PRN MG: 20 INJECTION INTRAMUSCULAR; INTRAVENOUS at 16:10

## 2019-04-10 RX ADMIN — LOSARTAN POTASSIUM SCH MG: 50 TABLET ORAL at 20:34

## 2019-04-10 RX ADMIN — CHLORTHALIDONE SCH MG: 25 TABLET ORAL at 09:14

## 2019-04-10 RX ADMIN — ASPIRIN SCH MG: 325 TABLET, DELAYED RELEASE ORAL at 09:14

## 2019-04-10 RX ADMIN — BACITRACIN PRN MLS/HR: 5000 INJECTION, POWDER, FOR SOLUTION INTRAMUSCULAR at 01:22

## 2019-04-10 NOTE — PDOC
PROGRESS NOTES


Chief Complaint


Chief Complaint


Hypertensive urgency resolved 


Obesity, BMI 42


Noncompliance


History neck surgery-off gabapentin and flexeril


History of orthopedic surgery-off warfarin





Plan: 


titrate off CArdene gtt


cards consult appreciated, follow recommendations


may be transferred out of ICU once off cardene


lipids noted, 


patient on amlodipine, losartan and chlorthalidone. 


Full code


Further recs pending course


Other supportive meds





History of Present Illness


History of Present Illness


Patient eating breakfast currently no acute distress. Headache has improved no 

chest pressure or discomfort was reported. No acute events reported overnight. 

Continues to still be on a Cardizem drip which will hopefully be discontinued 

throughout the day and transferred to a medical floor. No neurological deficits 

no complaints during my visit





Vitals


Vitals





Vital Signs








  Date Time  Temp Pulse Resp B/P (MAP) Pulse Ox O2 Delivery O2 Flow Rate FiO2


 


4/10/19 09:23  58  158/59    


 


4/10/19 06:00   20  99 Nasal Cannula 2.0 


 


4/10/19 04:00 98.3       





 98.3       











Physical Exam


General:  Alert, Oriented X3, Cooperative, No acute distress


Heart:  Regular rate (SR), Other (3/6 systolic murmur to LLS border; S4)


Abdomen:  Normal bowel sounds, Soft, No tenderness, No hepatosplenomegaly, No 

masses


Extremities:  No cyanosis, Other (3+ bilateral LE pitting edema)


Skin:  No breakdown, No significant lesion





Labs


LABS





Laboratory Tests








Test


 4/9/19


11:50 4/9/19


16:05 4/10/19


03:50


 


White Blood Count


 6.9 x10^3/uL


(4.0-11.0) 


 





 


Red Blood Count


 4.64 x10^6/uL


(3.50-5.40) 


 





 


Hemoglobin


 14.3 g/dL


(12.0-15.5) 


 





 


Hematocrit


 43.3 %


(36.0-47.0) 


 





 


Mean Corpuscular Volume 93 fL ()   


 


Mean Corpuscular Hemoglobin 31 pg (25-35)   


 


Mean Corpuscular Hemoglobin


Concent 33 g/dL


(31-37) 


 





 


Red Cell Distribution Width


 14.7 %


(11.5-14.5) 


 





 


Platelet Count


 184 x10^3/uL


(140-400) 


 





 


Neutrophils (%) (Auto) 66 % (31-73)   


 


Lymphocytes (%) (Auto) 18 % (24-48)   


 


Monocytes (%) (Auto) 10 % (0-9)   


 


Eosinophils (%) (Auto) 5 % (0-3)   


 


Basophils (%) (Auto) 1 % (0-3)   


 


Neutrophils # (Auto)


 4.6 x10^3uL


(1.8-7.7) 


 





 


Lymphocytes # (Auto)


 1.3 x10^3/uL


(1.0-4.8) 


 





 


Monocytes # (Auto)


 0.7 x10^3/uL


(0.0-1.1) 


 





 


Eosinophils # (Auto)


 0.3 x10^3/uL


(0.0-0.7) 


 





 


Basophils # (Auto)


 0.1 x10^3/uL


(0.0-0.2) 


 





 


Prothrombin Time


 12.9 SEC


(11.7-14.0) 


 





 


Prothromb Time International


Ratio 1.0 (0.8-1.1) 


 


 





 


D-Dimer (Elizabeth)


 0.65 ug/mlFEU


(0.00-0.50) 


 





 


Sodium Level


 141 mmol/L


(136-145) 


 141 mmol/L


(136-145)


 


Potassium Level


 4.0 mmol/L


(3.5-5.1) 


 4.4 mmol/L


(3.5-5.1)


 


Chloride Level


 104 mmol/L


() 


 105 mmol/L


()


 


Carbon Dioxide Level


 28 mmol/L


(21-32) 


 29 mmol/L


(21-32)


 


Anion Gap 9 (6-14)   7 (6-14) 


 


Blood Urea Nitrogen


 17 mg/dL


(7-20) 


 17 mg/dL


(7-20)


 


Creatinine


 1.1 mg/dL


(0.6-1.0) 


 1.1 mg/dL


(0.6-1.0)


 


Estimated GFR


(Cockcroft-Gault) 62.6 


 


 62.6 





 


BUN/Creatinine Ratio 15 (6-20)   


 


Glucose Level


 100 mg/dL


(70-99) 


 142 mg/dL


(70-99)


 


Lactic Acid Level


 0.8 mmol/L


(0.4-2.0) 


 





 


Calcium Level


 8.8 mg/dL


(8.5-10.1) 


 8.8 mg/dL


(8.5-10.1)


 


Total Bilirubin


 0.8 mg/dL


(0.2-1.0) 


 





 


Aspartate Amino Transf


(AST/SGOT) 27 U/L (15-37) 


 


 





 


Alanine Aminotransferase


(ALT/SGPT) 47 U/L (14-59) 


 


 





 


Alkaline Phosphatase


 99 U/L


() 


 





 


Creatine Kinase


 214 U/L


() 


 





 


Troponin I Quantitative


 0.153 ng/mL


(0.000-0.055) 


 0.212 ng/mL


(0.000-0.055)


 


NT-Pro-B-Type Natriuretic


Peptide 2166 pg/mL


(0-124) 


 





 


Total Protein


 6.9 g/dL


(6.4-8.2) 


 





 


Albumin


 3.2 g/dL


(3.4-5.0) 


 





 


Albumin/Globulin Ratio 0.9 (1.0-1.7)   


 


Triglycerides Level


 46 mg/dL


(0-150) 


 





 


Cholesterol Level


 164 mg/dL


(0-200) 


 





 


LDL Cholesterol, Calculated


 106 mg/dL


(0-100) 


 





 


VLDL Cholesterol, Calculated 9 mg/dL (0-40)   


 


Non-HDL Cholesterol Calculated


 115 mg/dL


(0-129) 


 





 


HDL Cholesterol


 49 mg/dL


(40-60) 


 





 


Cholesterol/HDL Ratio 3.3   


 


Thyroid Stimulating Hormone


(TSH) 0.801 uIU/mL


(0.358-3.74) 


 





 


Urine Collection Type  Unknown  


 


Urine Color  Yellow  


 


Urine Clarity  Clear  


 


Urine pH  6.5  


 


Urine Specific Gravity  <=1.005  


 


Urine Protein


 


 Negative mg/dL


(NEG-TRACE) 





 


Urine Glucose (UA)


 


 Negative mg/dL


(NEG) 





 


Urine Ketones (Stick)


 


 Negative mg/dL


(NEG) 





 


Urine Blood  Negative (NEG)  


 


Urine Nitrite  Negative (NEG)  


 


Urine Bilirubin  Negative (NEG)  


 


Urine Urobilinogen Dipstick


 


 0.2 mg/dL (0.2


mg/dL) 





 


Urine Leukocyte Esterase  Negative (NEG)  


 


Urine RBC  Occ /HPF (0-2)  


 


Urine WBC  Occ /HPF (0-4)  


 


Urine Squamous Epithelial


Cells 


 Occ /LPF 


 





 


Urine Bacteria  0 /HPF (0-FEW)  


 


Urine Opiates Screen  Neg (NEG)  


 


Urine Methadone Screen  Neg (NEG)  


 


Urine Barbiturates  Neg (NEG)  


 


Urine Phencyclidine Screen  Neg (NEG)  


 


Urine


Amphetamine/Methamphetamine 


 Neg (NEG) 


 





 


Urine Benzodiazepines Screen  Neg (NEG)  


 


Urine Cocaine Screen  Neg (NEG)  


 


Urine Cannabinoids Screen  Neg (NEG)  


 


Urine Ethyl Alcohol  Neg (NEG)  











Review of Systems


Review of Systems


Pertinent as per history of present illness otherwise 14 point review of system 

is negative





Assessment and Plan


Assessmemt and Plan


Problems


Medical Problems:


(1) CHF (congestive heart failure)


Status: Acute  





(2) Elevated troponin I level


Status: Acute  





(3) Malignant hypertension


Status: Acute  





(4) Shortness of breath


Status: Acute  











Comment


Review of Relevant


I have reviewed the following items fide (where applicable) has been applied.


Labs





Laboratory Tests








Test


 4/9/19


11:50 4/9/19


16:05 4/10/19


03:50


 


White Blood Count


 6.9 x10^3/uL


(4.0-11.0) 


 





 


Red Blood Count


 4.64 x10^6/uL


(3.50-5.40) 


 





 


Hemoglobin


 14.3 g/dL


(12.0-15.5) 


 





 


Hematocrit


 43.3 %


(36.0-47.0) 


 





 


Mean Corpuscular Volume 93 fL ()   


 


Mean Corpuscular Hemoglobin 31 pg (25-35)   


 


Mean Corpuscular Hemoglobin


Concent 33 g/dL


(31-37) 


 





 


Red Cell Distribution Width


 14.7 %


(11.5-14.5) 


 





 


Platelet Count


 184 x10^3/uL


(140-400) 


 





 


Neutrophils (%) (Auto) 66 % (31-73)   


 


Lymphocytes (%) (Auto) 18 % (24-48)   


 


Monocytes (%) (Auto) 10 % (0-9)   


 


Eosinophils (%) (Auto) 5 % (0-3)   


 


Basophils (%) (Auto) 1 % (0-3)   


 


Neutrophils # (Auto)


 4.6 x10^3uL


(1.8-7.7) 


 





 


Lymphocytes # (Auto)


 1.3 x10^3/uL


(1.0-4.8) 


 





 


Monocytes # (Auto)


 0.7 x10^3/uL


(0.0-1.1) 


 





 


Eosinophils # (Auto)


 0.3 x10^3/uL


(0.0-0.7) 


 





 


Basophils # (Auto)


 0.1 x10^3/uL


(0.0-0.2) 


 





 


Prothrombin Time


 12.9 SEC


(11.7-14.0) 


 





 


Prothromb Time International


Ratio 1.0 (0.8-1.1) 


 


 





 


D-Dimer (Elizabeth)


 0.65 ug/mlFEU


(0.00-0.50) 


 





 


Sodium Level


 141 mmol/L


(136-145) 


 141 mmol/L


(136-145)


 


Potassium Level


 4.0 mmol/L


(3.5-5.1) 


 4.4 mmol/L


(3.5-5.1)


 


Chloride Level


 104 mmol/L


() 


 105 mmol/L


()


 


Carbon Dioxide Level


 28 mmol/L


(21-32) 


 29 mmol/L


(21-32)


 


Anion Gap 9 (6-14)   7 (6-14) 


 


Blood Urea Nitrogen


 17 mg/dL


(7-20) 


 17 mg/dL


(7-20)


 


Creatinine


 1.1 mg/dL


(0.6-1.0) 


 1.1 mg/dL


(0.6-1.0)


 


Estimated GFR


(Cockcroft-Gault) 62.6 


 


 62.6 





 


BUN/Creatinine Ratio 15 (6-20)   


 


Glucose Level


 100 mg/dL


(70-99) 


 142 mg/dL


(70-99)


 


Lactic Acid Level


 0.8 mmol/L


(0.4-2.0) 


 





 


Calcium Level


 8.8 mg/dL


(8.5-10.1) 


 8.8 mg/dL


(8.5-10.1)


 


Total Bilirubin


 0.8 mg/dL


(0.2-1.0) 


 





 


Aspartate Amino Transf


(AST/SGOT) 27 U/L (15-37) 


 


 





 


Alanine Aminotransferase


(ALT/SGPT) 47 U/L (14-59) 


 


 





 


Alkaline Phosphatase


 99 U/L


() 


 





 


Creatine Kinase


 214 U/L


() 


 





 


Troponin I Quantitative


 0.153 ng/mL


(0.000-0.055) 


 0.212 ng/mL


(0.000-0.055)


 


NT-Pro-B-Type Natriuretic


Peptide 2166 pg/mL


(0-124) 


 





 


Total Protein


 6.9 g/dL


(6.4-8.2) 


 





 


Albumin


 3.2 g/dL


(3.4-5.0) 


 





 


Albumin/Globulin Ratio 0.9 (1.0-1.7)   


 


Triglycerides Level


 46 mg/dL


(0-150) 


 





 


Cholesterol Level


 164 mg/dL


(0-200) 


 





 


LDL Cholesterol, Calculated


 106 mg/dL


(0-100) 


 





 


VLDL Cholesterol, Calculated 9 mg/dL (0-40)   


 


Non-HDL Cholesterol Calculated


 115 mg/dL


(0-129) 


 





 


HDL Cholesterol


 49 mg/dL


(40-60) 


 





 


Cholesterol/HDL Ratio 3.3   


 


Thyroid Stimulating Hormone


(TSH) 0.801 uIU/mL


(0.358-3.74) 


 





 


Urine Collection Type  Unknown  


 


Urine Color  Yellow  


 


Urine Clarity  Clear  


 


Urine pH  6.5  


 


Urine Specific Gravity  <=1.005  


 


Urine Protein


 


 Negative mg/dL


(NEG-TRACE) 





 


Urine Glucose (UA)


 


 Negative mg/dL


(NEG) 





 


Urine Ketones (Stick)


 


 Negative mg/dL


(NEG) 





 


Urine Blood  Negative (NEG)  


 


Urine Nitrite  Negative (NEG)  


 


Urine Bilirubin  Negative (NEG)  


 


Urine Urobilinogen Dipstick


 


 0.2 mg/dL (0.2


mg/dL) 





 


Urine Leukocyte Esterase  Negative (NEG)  


 


Urine RBC  Occ /HPF (0-2)  


 


Urine WBC  Occ /HPF (0-4)  


 


Urine Squamous Epithelial


Cells 


 Occ /LPF 


 





 


Urine Bacteria  0 /HPF (0-FEW)  


 


Urine Opiates Screen  Neg (NEG)  


 


Urine Methadone Screen  Neg (NEG)  


 


Urine Barbiturates  Neg (NEG)  


 


Urine Phencyclidine Screen  Neg (NEG)  


 


Urine


Amphetamine/Methamphetamine 


 Neg (NEG) 


 





 


Urine Benzodiazepines Screen  Neg (NEG)  


 


Urine Cocaine Screen  Neg (NEG)  


 


Urine Cannabinoids Screen  Neg (NEG)  


 


Urine Ethyl Alcohol  Neg (NEG)  








Laboratory Tests








Test


 4/9/19


11:50 4/9/19


16:05 4/10/19


03:50


 


White Blood Count


 6.9 x10^3/uL


(4.0-11.0) 


 





 


Red Blood Count


 4.64 x10^6/uL


(3.50-5.40) 


 





 


Hemoglobin


 14.3 g/dL


(12.0-15.5) 


 





 


Hematocrit


 43.3 %


(36.0-47.0) 


 





 


Mean Corpuscular Volume 93 fL ()   


 


Mean Corpuscular Hemoglobin 31 pg (25-35)   


 


Mean Corpuscular Hemoglobin


Concent 33 g/dL


(31-37) 


 





 


Red Cell Distribution Width


 14.7 %


(11.5-14.5) 


 





 


Platelet Count


 184 x10^3/uL


(140-400) 


 





 


Neutrophils (%) (Auto) 66 % (31-73)   


 


Lymphocytes (%) (Auto) 18 % (24-48)   


 


Monocytes (%) (Auto) 10 % (0-9)   


 


Eosinophils (%) (Auto) 5 % (0-3)   


 


Basophils (%) (Auto) 1 % (0-3)   


 


Neutrophils # (Auto)


 4.6 x10^3uL


(1.8-7.7) 


 





 


Lymphocytes # (Auto)


 1.3 x10^3/uL


(1.0-4.8) 


 





 


Monocytes # (Auto)


 0.7 x10^3/uL


(0.0-1.1) 


 





 


Eosinophils # (Auto)


 0.3 x10^3/uL


(0.0-0.7) 


 





 


Basophils # (Auto)


 0.1 x10^3/uL


(0.0-0.2) 


 





 


Prothrombin Time


 12.9 SEC


(11.7-14.0) 


 





 


Prothromb Time International


Ratio 1.0 (0.8-1.1) 


 


 





 


D-Dimer (Elizabeth)


 0.65 ug/mlFEU


(0.00-0.50) 


 





 


Sodium Level


 141 mmol/L


(136-145) 


 141 mmol/L


(136-145)


 


Potassium Level


 4.0 mmol/L


(3.5-5.1) 


 4.4 mmol/L


(3.5-5.1)


 


Chloride Level


 104 mmol/L


() 


 105 mmol/L


()


 


Carbon Dioxide Level


 28 mmol/L


(21-32) 


 29 mmol/L


(21-32)


 


Anion Gap 9 (6-14)   7 (6-14) 


 


Blood Urea Nitrogen


 17 mg/dL


(7-20) 


 17 mg/dL


(7-20)


 


Creatinine


 1.1 mg/dL


(0.6-1.0) 


 1.1 mg/dL


(0.6-1.0)


 


Estimated GFR


(Cockcroft-Gault) 62.6 


 


 62.6 





 


BUN/Creatinine Ratio 15 (6-20)   


 


Glucose Level


 100 mg/dL


(70-99) 


 142 mg/dL


(70-99)


 


Lactic Acid Level


 0.8 mmol/L


(0.4-2.0) 


 





 


Calcium Level


 8.8 mg/dL


(8.5-10.1) 


 8.8 mg/dL


(8.5-10.1)


 


Total Bilirubin


 0.8 mg/dL


(0.2-1.0) 


 





 


Aspartate Amino Transf


(AST/SGOT) 27 U/L (15-37) 


 


 





 


Alanine Aminotransferase


(ALT/SGPT) 47 U/L (14-59) 


 


 





 


Alkaline Phosphatase


 99 U/L


() 


 





 


Creatine Kinase


 214 U/L


() 


 





 


Troponin I Quantitative


 0.153 ng/mL


(0.000-0.055) 


 0.212 ng/mL


(0.000-0.055)


 


NT-Pro-B-Type Natriuretic


Peptide 2166 pg/mL


(0-124) 


 





 


Total Protein


 6.9 g/dL


(6.4-8.2) 


 





 


Albumin


 3.2 g/dL


(3.4-5.0) 


 





 


Albumin/Globulin Ratio 0.9 (1.0-1.7)   


 


Triglycerides Level


 46 mg/dL


(0-150) 


 





 


Cholesterol Level


 164 mg/dL


(0-200) 


 





 


LDL Cholesterol, Calculated


 106 mg/dL


(0-100) 


 





 


VLDL Cholesterol, Calculated 9 mg/dL (0-40)   


 


Non-HDL Cholesterol Calculated


 115 mg/dL


(0-129) 


 





 


HDL Cholesterol


 49 mg/dL


(40-60) 


 





 


Cholesterol/HDL Ratio 3.3   


 


Thyroid Stimulating Hormone


(TSH) 0.801 uIU/mL


(0.358-3.74) 


 





 


Urine Collection Type  Unknown  


 


Urine Color  Yellow  


 


Urine Clarity  Clear  


 


Urine pH  6.5  


 


Urine Specific Gravity  <=1.005  


 


Urine Protein


 


 Negative mg/dL


(NEG-TRACE) 





 


Urine Glucose (UA)


 


 Negative mg/dL


(NEG) 





 


Urine Ketones (Stick)


 


 Negative mg/dL


(NEG) 





 


Urine Blood  Negative (NEG)  


 


Urine Nitrite  Negative (NEG)  


 


Urine Bilirubin  Negative (NEG)  


 


Urine Urobilinogen Dipstick


 


 0.2 mg/dL (0.2


mg/dL) 





 


Urine Leukocyte Esterase  Negative (NEG)  


 


Urine RBC  Occ /HPF (0-2)  


 


Urine WBC  Occ /HPF (0-4)  


 


Urine Squamous Epithelial


Cells 


 Occ /LPF 


 





 


Urine Bacteria  0 /HPF (0-FEW)  


 


Urine Opiates Screen  Neg (NEG)  


 


Urine Methadone Screen  Neg (NEG)  


 


Urine Barbiturates  Neg (NEG)  


 


Urine Phencyclidine Screen  Neg (NEG)  


 


Urine


Amphetamine/Methamphetamine 


 Neg (NEG) 


 





 


Urine Benzodiazepines Screen  Neg (NEG)  


 


Urine Cocaine Screen  Neg (NEG)  


 


Urine Cannabinoids Screen  Neg (NEG)  


 


Urine Ethyl Alcohol  Neg (NEG)  








Medications





Current Medications


Sodium Chloride 1,000 ml @  1,000 mls/hr Q1H IV  Last administered on 4/9/19at 

12:25;  Start 4/9/19 at 11:45;  Stop 4/9/19 at 12:44;  Status DC


Albuterol/ Ipratropium (Duoneb) 3 ml 1X  ONCE NEB  Last administered on 4/9/ 19at 12:37;  Start 4/9/19 at 12:15;  Stop 4/9/19 at 12:16;  Status DC


Methylprednisolone Sodium Succinate (SOLU-Medrol 125MG VIAL) 150 mg 1X  ONCE IV

  Last administered on 4/9/19at 12:23;  Start 4/9/19 at 12:15;  Stop 4/9/19 at 

12:24;  Status DC


Hydralazine HCl (Apresoline Inj) 10 mg 1X  ONCE IVP  Last administered on 4/9/ 19at 12:24;  Start 4/9/19 at 12:15;  Stop 4/9/19 at 12:16;  Status DC


Methylprednisolone Sodium Succinate (SOLU-Medrol 125MG VIAL) 125 mg 1X  ONCE IV 

;  Start 4/9/19 at 12:30;  Stop 4/9/19 at 12:31;  Status DC


Hydralazine HCl (Apresoline Inj) 10 mg 1X  ONCE IVP  Last administered on 4/9/ 19at 13:36;  Start 4/9/19 at 13:30;  Stop 4/9/19 at 13:31;  Status DC


Nicardipine HCl 50 mg/Sodium Chloride 250 ml @  25 mls/hr CONT  PRN IV SEE I/O 

RECORD Last administered on 4/10/19at 01:22;  Start 4/9/19 at 13:30


Ferrous Sulfate (Feosol) 325 mg BID PO ;  Start 4/9/19 at 21:00;  Stop 4/9/19 

at 21:00;  Status DC


Gabapentin (Neurontin) 300 mg TID PO ;  Start 4/9/19 at 15:00;  Stop 4/9/19 at 

15:00;  Status DC


Oxycodone/ Acetaminophen (Percocet 7.5/ 325) 1 tab PRN Q4HRS  PRN PO pain relief

;  Start 4/9/19 at 14:00;  Status Cancel


Non-Formulary Medication (Azilsartan Med/ Chlorthalidone (Edarbyclor 40-25 Mg 

Tablet)) 1 each DAILY PO ;  Start 4/10/19 at 09:00;  Status UNV


Cyclobenzaprine HCl (Flexeril) 5 mg QHS PO ;  Start 4/9/19 at 21:00;  Stop 4/9/ 19 at 21:00;  Status DC


Multivitamins (Thera M Plus) 1 tab DAILY PO  Last administered on 4/9/19at 14:58

;  Start 4/9/19 at 15:00;  Stop 4/9/19 at 15:57;  Status DC


Non-Formulary Medication (Warfarin Sodium (Coumadin)) 1 tab DAILY PO ;  Start 4/

10/19 at 09:00;  Stop 4/10/19 at 09:00;  Status DC


Lisinopril (Prinivil) 10 mg DAILY PO ;  Start 4/9/19 at 15:00;  Stop 4/9/19 at 

15:00;  Status DC


Hydrochlorothiazide (Hydrodiuril) 25 mg DAILY PO  Last administered on 4/9/19at 

14:59;  Start 4/9/19 at 15:00;  Stop 4/9/19 at 15:34;  Status DC


Aspirin (Ecotrin) 325 mg DAILYWBKFT PO  Last administered on 4/10/19at 09:14;  

Start 4/9/19 at 15:00


Losartan Potassium (Cozaar) 50 mg DAILY PO ;  Start 4/9/19 at 15:00;  Stop 4/9/ 19 at 15:00;  Status DC


Chlorthalidone (Thalitone) 25 mg DAILY PO  Last administered on 4/10/19at 09:14

;  Start 4/9/19 at 15:00


Temazepam (Restoril) 7.5 mg PRN QHS  PRN PO INSOMNIA;  Start 4/9/19 at 14:15


Nicotine (Nicoderm Cq 21mg) 1 patch PRN DAILY  PRN TD SMOKING CESSATION Last 

administered on 4/10/19at 09:12;  Start 4/9/19 at 14:15


Hydralazine HCl (Apresoline Inj) 10 mg PRN Q4HRS  PRN IVP ELEVATED BP, SEE 

COMMENTS;  Start 4/9/19 at 14:30


Ondansetron HCl (Zofran) 4 mg STK-MED ONCE .ROUTE ;  Start 4/9/19 at 14:32;  

Stop 4/9/19 at 14:33;  Status DC


Ondansetron HCl (Zofran) 4 mg PRN Q6HRS  PRN IV NAUSEA/VOMITING Last 

administered on 4/9/19at 14:59;  Start 4/9/19 at 14:45


Acetaminophen/ Hydrocodone Bitart (Lortab 7.5/325) 1 tab PRN Q6HRS  PRN PO PAIN

;  Start 4/9/19 at 16:00


Amlodipine Besylate (Norvasc) 10 mg DAILY PO  Last administered on 4/9/19at 17:

20;  Start 4/9/19 at 17:00


Losartan Potassium (Cozaar) 100 mg DAILY PO  Last administered on 4/10/19at 09:

13;  Start 4/10/19 at 09:00


Pantoprazole Sodium (Protonix) 40 mg 1X  ONCE PO  Last administered on 4/9/19at 

17:20;  Start 4/9/19 at 17:00;  Stop 4/9/19 at 17:01;  Status DC


Pantoprazole Sodium (Protonix) 40 mg DAILYAC PO  Last administered on 4/10/19at 

09:14;  Start 4/10/19 at 07:30


Atorvastatin Calcium (Lipitor) 10 mg QHS PO  Last administered on 4/9/19at 20:50

;  Start 4/9/19 at 21:00


Hydralazine HCl (Apresoline) 50 mg TID PO  Last administered on 4/10/19at 09:23

;  Start 4/10/19 at 09:30





Active Scripts


Active


Reported


Hydrocodone-Apap 7.5-325  ** (Hydrocodone Bit/Acetaminophen) 1 Tab Tablet 1 Tab 

PO PRN Q6HRS PRN


Edarbyclor 40-25 Mg Tablet (Azilsartan/Chlorthalidone) 1 Each Tablet 1 Each PO 

DAILY


Vitals/I & O





Vital Sign - Last 24 Hours








 4/9/19 4/9/19 4/9/19 4/9/19





 11:26 11:40 12:08 12:24


 


Temp 98.8   





 98.8   


 


Pulse 72 64 50 47


 


Resp 26  28 


 


B/P (MAP) 226/94 (138) 255/180 (205) 238/102 (147) 238/102


 


Pulse Ox 95 91 100 


 


O2 Delivery Room Air Room Air Room Air 


 


    





    





 4/9/19 4/9/19 4/9/19 4/9/19





 12:38 12:40 13:08 13:15


 


Pulse   46 50


 


Resp   23 32


 


B/P (MAP) 227/109 (148)  246/116 (159) 228/105 (146)


 


Pulse Ox  94  


 


O2 Delivery  Room Air  





 4/9/19 4/9/19 4/9/19 4/9/19





 13:36 13:38 13:51 14:45


 


Temp    98.1





    98.1


 


Pulse 50 50 48 58


 


Resp  35 27 20


 


B/P (MAP) 233/93 217/86 (129) 199/82 (121) 187/81 (116)


 


Pulse Ox   99 98


 


O2 Delivery   Nasal Cannula Nasal Cannula


 


O2 Flow Rate   3.0 2.0


 


    





    





 4/9/19 4/9/19 4/9/19 4/9/19





 15:00 15:00 15:30 16:00


 


Pulse 54  58 56


 


Resp 22   23


 


B/P (MAP) 173/72 (105)  152/52 (85) 158/60 (92)


 


Pulse Ox 94   94


 


O2 Delivery Nasal Cannula Nasal Cannula  Nasal Cannula


 


O2 Flow Rate 2.0 2.0  2.0





 4/9/19 4/9/19 4/9/19 4/9/19





 17:00 17:20 18:00 20:19


 


Pulse 61 61 70 63


 


Resp 22  24 21


 


B/P (MAP) 164/76 (105) 164/76 174/73 (106) 159/60 (93)


 


Pulse Ox 95  93 97


 


O2 Delivery Nasal Cannula  Nasal Cannula Nasal Cannula


 


O2 Flow Rate 2.0  2.0 2.0





 4/9/19 4/9/19 4/9/19 4/9/19





 21:00 23:00 23:59 23:59


 


Pulse 59 56  59


 


Resp 18 19  18


 


B/P (MAP) 150/66 (94) 149/58 (88)  150/66 (94)


 


Pulse Ox 97 97  97


 


O2 Delivery Nasal Cannula Nasal Cannula Nasal Cannula Nasal Cannula


 


O2 Flow Rate 2.0 2.0 2.0 2.0





 4/10/19 4/10/19 4/10/19 4/10/19





 01:00 02:00 03:00 03:45


 


Temp 98.1   





 98.1   


 


Pulse 56 56 56 60


 


Resp 19 20 20 18


 


B/P (MAP) 146/60 (88) 145/60 (88) 142/60 (87) 143/61 (88)


 


Pulse Ox 97 97 97 97


 


O2 Delivery Nasal Cannula Nasal Cannula Nasal Cannula Nasal Cannula


 


O2 Flow Rate 2.0 2.0 2.0 2.0


 


    





    





 4/10/19 4/10/19 4/10/19 4/10/19





 04:00 04:00 04:15 05:00


 


Temp  98.3  





  98.3  


 


Pulse  56 52 59


 


Resp  20 20 18


 


B/P (MAP)  153/60 (91) 158/62 (94) 150/67 (94)


 


Pulse Ox  97 98 99


 


O2 Delivery Nasal Cannula Nasal Cannula Nasal Cannula Nasal Cannula


 


O2 Flow Rate 2.0 2.0 2.0 2.0


 


    





    





 4/10/19 4/10/19 4/10/19 





 06:00 09:13 09:23 


 


Pulse 54 58 58 


 


Resp 20   


 


B/P (MAP) 141/59 (86) 158/67 158/59 


 


Pulse Ox 99   


 


O2 Delivery Nasal Cannula   


 


O2 Flow Rate 2.0   














Intake and Output   


 


 4/9/19 4/9/19 4/10/19





 15:00 23:00 07:00


 


Intake Total  1440 ml 300 ml


 


Output Total 255 ml 1850 ml 1225 ml


 


Balance -255 ml -410 ml -925 ml

















SANTIAGO CROUCH MD Apr 10, 2019 09:29

## 2019-04-10 NOTE — NUR
NPO till after scheduled  renal ultrasound. Patient informed of test . Cardene stopped 
0800. Po meds effective at this point. 

-------------------------------------------------------------------------------

Addendum: 04/10/19 at 1232 by Mayte Arellano RN

-------------------------------------------------------------------------------

Amended: Links added.

## 2019-04-10 NOTE — NUR
Hypertensive episode controlled w prn IV medication. Talking on phone when mell in to do 
thi-sound. Tech had others to run and was unable to "wait " per patients request  for her to 
finish "personal " business." Discussion  w patient on  compliance of home meds.  Admits to 
not taking "when she feels OK". Refused printed information on meds. Strongly suggested she 
not stop or not  take meds. Informed in detailed of. adverse effects of not taking meds . 
[facility] downgrade w/o bed available for transfer. Cont POC

## 2019-04-10 NOTE — NUR
SS following for discharge planning. SS reviewed pt chart. Pt is from home. No discharge 
needs noted at this time. SS will continue to follow for pending discharge needs.

## 2019-04-10 NOTE — PDOC
RUIZ ARROYO APRN 4/10/19 0900:


CARDIO Progress Notes


Date and Time


Date of Service


4/10/2019


Time of Evaluation


0850





Subjective


Subjective:  No Chest Pain, No shortness of breath, No Palpitations





Vitals


Vitals





Vital Signs








  Date Time  Temp Pulse Resp B/P (MAP) Pulse Ox O2 Delivery O2 Flow Rate FiO2


 


4/10/19 06:00  54 20 141/59 (86) 99 Nasal Cannula 2.0 


 


4/10/19 04:00 98.3       





 98.3       








Weight


Weight [ ]





Input and Output


Intake and Output











Intake and Output 


 


 4/10/19





 07:00


 


Intake Total 1740 ml


 


Output Total 3330 ml


 


Balance -1590 ml


 


 


 


Intake Oral 1440 ml


 


IV Total 300 ml


 


Output Urine Total 3330 ml











Laboratory


Labs





Laboratory Tests








Test


 4/9/19


11:50 4/9/19


16:05 4/10/19


03:50


 


White Blood Count


 6.9 x10^3/uL


(4.0-11.0) 


 





 


Red Blood Count


 4.64 x10^6/uL


(3.50-5.40) 


 





 


Hemoglobin


 14.3 g/dL


(12.0-15.5) 


 





 


Hematocrit


 43.3 %


(36.0-47.0) 


 





 


Mean Corpuscular Volume 93 fL ()   


 


Mean Corpuscular Hemoglobin 31 pg (25-35)   


 


Mean Corpuscular Hemoglobin


Concent 33 g/dL


(31-37) 


 





 


Red Cell Distribution Width


 14.7 %


(11.5-14.5) 


 





 


Platelet Count


 184 x10^3/uL


(140-400) 


 





 


Neutrophils (%) (Auto) 66 % (31-73)   


 


Lymphocytes (%) (Auto) 18 % (24-48)   


 


Monocytes (%) (Auto) 10 % (0-9)   


 


Eosinophils (%) (Auto) 5 % (0-3)   


 


Basophils (%) (Auto) 1 % (0-3)   


 


Neutrophils # (Auto)


 4.6 x10^3uL


(1.8-7.7) 


 





 


Lymphocytes # (Auto)


 1.3 x10^3/uL


(1.0-4.8) 


 





 


Monocytes # (Auto)


 0.7 x10^3/uL


(0.0-1.1) 


 





 


Eosinophils # (Auto)


 0.3 x10^3/uL


(0.0-0.7) 


 





 


Basophils # (Auto)


 0.1 x10^3/uL


(0.0-0.2) 


 





 


Prothrombin Time


 12.9 SEC


(11.7-14.0) 


 





 


Prothromb Time International


Ratio 1.0 (0.8-1.1) 


 


 





 


D-Dimer (Elizabeth)


 0.65 ug/mlFEU


(0.00-0.50) 


 





 


Sodium Level


 141 mmol/L


(136-145) 


 141 mmol/L


(136-145)


 


Potassium Level


 4.0 mmol/L


(3.5-5.1) 


 4.4 mmol/L


(3.5-5.1)


 


Chloride Level


 104 mmol/L


() 


 105 mmol/L


()


 


Carbon Dioxide Level


 28 mmol/L


(21-32) 


 29 mmol/L


(21-32)


 


Anion Gap 9 (6-14)   7 (6-14) 


 


Blood Urea Nitrogen


 17 mg/dL


(7-20) 


 17 mg/dL


(7-20)


 


Creatinine


 1.1 mg/dL


(0.6-1.0) 


 1.1 mg/dL


(0.6-1.0)


 


Estimated GFR


(Cockcroft-Gault) 62.6 


 


 62.6 





 


BUN/Creatinine Ratio 15 (6-20)   


 


Glucose Level


 100 mg/dL


(70-99) 


 142 mg/dL


(70-99)


 


Lactic Acid Level


 0.8 mmol/L


(0.4-2.0) 


 





 


Calcium Level


 8.8 mg/dL


(8.5-10.1) 


 8.8 mg/dL


(8.5-10.1)


 


Total Bilirubin


 0.8 mg/dL


(0.2-1.0) 


 





 


Aspartate Amino Transf


(AST/SGOT) 27 U/L (15-37) 


 


 





 


Alanine Aminotransferase


(ALT/SGPT) 47 U/L (14-59) 


 


 





 


Alkaline Phosphatase


 99 U/L


() 


 





 


Creatine Kinase


 214 U/L


() 


 





 


Troponin I Quantitative


 0.153 ng/mL


(0.000-0.055) 


 0.212 ng/mL


(0.000-0.055)


 


NT-Pro-B-Type Natriuretic


Peptide 2166 pg/mL


(0-124) 


 





 


Total Protein


 6.9 g/dL


(6.4-8.2) 


 





 


Albumin


 3.2 g/dL


(3.4-5.0) 


 





 


Albumin/Globulin Ratio 0.9 (1.0-1.7)   


 


Triglycerides Level


 46 mg/dL


(0-150) 


 





 


Cholesterol Level


 164 mg/dL


(0-200) 


 





 


LDL Cholesterol, Calculated


 106 mg/dL


(0-100) 


 





 


VLDL Cholesterol, Calculated 9 mg/dL (0-40)   


 


Non-HDL Cholesterol Calculated


 115 mg/dL


(0-129) 


 





 


HDL Cholesterol


 49 mg/dL


(40-60) 


 





 


Cholesterol/HDL Ratio 3.3   


 


Thyroid Stimulating Hormone


(TSH) 0.801 uIU/mL


(0.358-3.74) 


 





 


Urine Collection Type  Unknown  


 


Urine Color  Yellow  


 


Urine Clarity  Clear  


 


Urine pH  6.5  


 


Urine Specific Gravity  <=1.005  


 


Urine Protein


 


 Negative mg/dL


(NEG-TRACE) 





 


Urine Glucose (UA)


 


 Negative mg/dL


(NEG) 





 


Urine Ketones (Stick)


 


 Negative mg/dL


(NEG) 





 


Urine Blood  Negative (NEG)  


 


Urine Nitrite  Negative (NEG)  


 


Urine Bilirubin  Negative (NEG)  


 


Urine Urobilinogen Dipstick


 


 0.2 mg/dL (0.2


mg/dL) 





 


Urine Leukocyte Esterase  Negative (NEG)  


 


Urine RBC  Occ /HPF (0-2)  


 


Urine WBC  Occ /HPF (0-4)  


 


Urine Squamous Epithelial


Cells 


 Occ /LPF 


 





 


Urine Bacteria  0 /HPF (0-FEW)  


 


Urine Opiates Screen  Neg (NEG)  


 


Urine Methadone Screen  Neg (NEG)  


 


Urine Barbiturates  Neg (NEG)  


 


Urine Phencyclidine Screen  Neg (NEG)  


 


Urine


Amphetamine/Methamphetamine 


 Neg (NEG) 


 





 


Urine Benzodiazepines Screen  Neg (NEG)  


 


Urine Cocaine Screen  Neg (NEG)  


 


Urine Cannabinoids Screen  Neg (NEG)  


 


Urine Ethyl Alcohol  Neg (NEG)  











Physical Exam


HEENT:  Neck Supple W Full Motion


Chest:  Symmetric


LUNGS:  Other (diminished bases)


Heart:  S1S2, RRR (SR/SB)


Abdomen:  Soft N/T


Extremities:  No Calf Tenderness, Other (2-3+ bilateral LE pitting edema)


Neurology:  alert, oriented, follow commands





Assessment


Assessment


1. Malignant HTN: due to Lifestyle issues and inconsistent use of BP meds and 

NSAID use


2. Acute on chronic diastolic CHF; EF and WM nml with mod pulmonary HTN 


3. Elevated troponin: mild peaked at 0.2. Suspect demand mediated, type 2 as 

above. EKG SB no acute ST-T wave changes, no CP


4. Tobaccoism


5. HLP


6. Obesity


7. Sinus bradycardia: suspect reflexively induced by high BP, no pauses and no 

syncopal nor presyncopal event at home 


8. Chronic high dose NSAID use with chronic low back pain. 6 tabs ibuprofen and 

6 tabs of aleve a day avg. 





Recommendations


1. No AV robbie blocking agents. titrate off cardene. Start on hydralazine PO 

with PRN IV in addition to norvasc, losartan and chlorthalidone


2. Renal duplex this afternoon as pt had breakfast. Diuresing well with 

chlorthalidone. Start on statin. ASA. 


3. Smoking cessation


4. Discussed lifestyle modification. Wt loss, minimizing processed food, 

exercise, DASH diet, decreaseing soda. Stop routine NSAID and only do PRN


5. Will need outpt WANDA workup. 


6. Encourage to see ophthalmology routine appt. 


7. Given her risk factors, ischemic workup likely as an outpt in the for m of 

stress test pending current workup result.


8. May transfer to [facility]





CODY JUAREZ MD 4/11/19 0812:


CARDIO Progress Notes


Assessment


Assessment


Patient seen and examined 4/10/19.  Agree with NP's assessment and plan.


Malignant hypertension better controlled. Patient presently off Cardene drip.


Acute on chronic diastolic heart failure better compensated.


Slight troponin elevation secondary to demand ischemia.


2-D echo showed normal LV function.


Plan ischemic evaluation as an outpatient.











RUIZ ARROYO Apr 10, 2019 09:00


CODY JUAREZ MD Apr 11, 2019 08:12

## 2019-04-11 VITALS — DIASTOLIC BLOOD PRESSURE: 72 MMHG | SYSTOLIC BLOOD PRESSURE: 165 MMHG

## 2019-04-11 VITALS — SYSTOLIC BLOOD PRESSURE: 188 MMHG | DIASTOLIC BLOOD PRESSURE: 83 MMHG

## 2019-04-11 VITALS — DIASTOLIC BLOOD PRESSURE: 69 MMHG | SYSTOLIC BLOOD PRESSURE: 163 MMHG

## 2019-04-11 VITALS — SYSTOLIC BLOOD PRESSURE: 178 MMHG | DIASTOLIC BLOOD PRESSURE: 66 MMHG

## 2019-04-11 VITALS — SYSTOLIC BLOOD PRESSURE: 165 MMHG | DIASTOLIC BLOOD PRESSURE: 66 MMHG

## 2019-04-11 VITALS — SYSTOLIC BLOOD PRESSURE: 177 MMHG | DIASTOLIC BLOOD PRESSURE: 74 MMHG

## 2019-04-11 VITALS — SYSTOLIC BLOOD PRESSURE: 166 MMHG | DIASTOLIC BLOOD PRESSURE: 65 MMHG

## 2019-04-11 VITALS — DIASTOLIC BLOOD PRESSURE: 72 MMHG | SYSTOLIC BLOOD PRESSURE: 163 MMHG

## 2019-04-11 RX ADMIN — ONDANSETRON PRN MG: 2 INJECTION INTRAMUSCULAR; INTRAVENOUS at 06:01

## 2019-04-11 RX ADMIN — HYDRALAZINE HYDROCHLORIDE PRN MG: 20 INJECTION INTRAMUSCULAR; INTRAVENOUS at 00:27

## 2019-04-11 RX ADMIN — PANTOPRAZOLE SODIUM SCH MG: 40 TABLET, DELAYED RELEASE ORAL at 08:33

## 2019-04-11 RX ADMIN — ASPIRIN SCH MG: 325 TABLET, DELAYED RELEASE ORAL at 08:32

## 2019-04-11 RX ADMIN — NICOTINE PRN PATCH: 21 PATCH, EXTENDED RELEASE TOPICAL at 08:34

## 2019-04-11 RX ADMIN — CHLORTHALIDONE SCH MG: 25 TABLET ORAL at 08:33

## 2019-04-11 RX ADMIN — HYDRALAZINE HYDROCHLORIDE PRN MG: 20 INJECTION INTRAMUSCULAR; INTRAVENOUS at 04:27

## 2019-04-11 NOTE — PDOC
PROGRESS NOTES


Chief Complaint


Chief Complaint


Hypertensive urgency resolved 


Obesity, BMI 42


Noncompliance


History neck surgery-off gabapentin and flexeril


History of orthopedic surgery-off warfarin





Plan: 


may transfer to step down when bed available


cards consult appreciated, follow recommendations


lipids noted, 


patient on amlodipine, losartan and chlorthalidone. 


Full code


Further recs pending course


Other supportive meds





History of Present Illness


History of Present Illness


Patient eating breakfast currently no acute distress. Headache has improved no 

chest pressure or discomfort was reported. No acute events reported overnight. 

Continues to still be on a Cardizem drip which will hopefully be discontinued 

throughout the day and transferred to a medical floor. No neurological deficits 

no complaints during my visit





Vitals


Vitals





Vital Signs








  Date Time  Temp Pulse Resp B/P (MAP) Pulse Ox O2 Delivery O2 Flow Rate FiO2


 


4/11/19 08:32  51  163/72    


 


4/11/19 07:05     94 Room Air 96.0 


 


4/11/19 06:03   20     


 


4/11/19 04:20 98.2       





 98.2       











Physical Exam


General:  Alert, Oriented X3, Cooperative, No acute distress


Heart:  Regular rate (SR), Other (3/6 systolic murmur to LLS border; S4)


Abdomen:  Normal bowel sounds, Soft, No tenderness, No hepatosplenomegaly, No 

masses


Extremities:  No cyanosis, Other (3+ bilateral LE pitting edema)


Skin:  No breakdown, No significant lesion





Review of Systems


Review of Systems


Pertinent as per history of present illness otherwise 14 point review of system 

is negative





Assessment and Plan


Assessmemt and Plan


Problems


Medical Problems:


(1) CHF (congestive heart failure)


Status: Acute  





(2) Elevated troponin I level


Status: Acute  





(3) Malignant hypertension


Status: Acute  





(4) Shortness of breath


Status: Acute  











Comment


Review of Relevant


I have reviewed the following items fide (where applicable) has been applied.


Labs





Laboratory Tests








Test


 4/9/19


11:50 4/9/19


14:50 4/9/19


16:05 4/10/19


03:50


 


White Blood Count


 6.9 x10^3/uL


(4.0-11.0) 


 


 





 


Red Blood Count


 4.64 x10^6/uL


(3.50-5.40) 


 


 





 


Hemoglobin


 14.3 g/dL


(12.0-15.5) 


 


 





 


Hematocrit


 43.3 %


(36.0-47.0) 


 


 





 


Mean Corpuscular Volume 93 fL ()    


 


Mean Corpuscular Hemoglobin 31 pg (25-35)    


 


Mean Corpuscular Hemoglobin


Concent 33 g/dL


(31-37) 


 


 





 


Red Cell Distribution Width


 14.7 %


(11.5-14.5) 


 


 





 


Platelet Count


 184 x10^3/uL


(140-400) 


 


 





 


Neutrophils (%) (Auto) 66 % (31-73)    


 


Lymphocytes (%) (Auto) 18 % (24-48)    


 


Monocytes (%) (Auto) 10 % (0-9)    


 


Eosinophils (%) (Auto) 5 % (0-3)    


 


Basophils (%) (Auto) 1 % (0-3)    


 


Neutrophils # (Auto)


 4.6 x10^3uL


(1.8-7.7) 


 


 





 


Lymphocytes # (Auto)


 1.3 x10^3/uL


(1.0-4.8) 


 


 





 


Monocytes # (Auto)


 0.7 x10^3/uL


(0.0-1.1) 


 


 





 


Eosinophils # (Auto)


 0.3 x10^3/uL


(0.0-0.7) 


 


 





 


Basophils # (Auto)


 0.1 x10^3/uL


(0.0-0.2) 


 


 





 


Prothrombin Time


 12.9 SEC


(11.7-14.0) 


 


 





 


Prothromb Time International


Ratio 1.0 (0.8-1.1) 


 


 


 





 


D-Dimer (Elizabeth)


 0.65 ug/mlFEU


(0.00-0.50) 


 


 





 


Sodium Level


 141 mmol/L


(136-145) 


 


 141 mmol/L


(136-145)


 


Potassium Level


 4.0 mmol/L


(3.5-5.1) 


 


 4.4 mmol/L


(3.5-5.1)


 


Chloride Level


 104 mmol/L


() 


 


 105 mmol/L


()


 


Carbon Dioxide Level


 28 mmol/L


(21-32) 


 


 29 mmol/L


(21-32)


 


Anion Gap 9 (6-14)    7 (6-14) 


 


Blood Urea Nitrogen


 17 mg/dL


(7-20) 


 


 17 mg/dL


(7-20)


 


Creatinine


 1.1 mg/dL


(0.6-1.0) 


 


 1.1 mg/dL


(0.6-1.0)


 


Estimated GFR


(Cockcroft-Gault) 62.6 


 


 


 62.6 





 


BUN/Creatinine Ratio 15 (6-20)    


 


Glucose Level


 100 mg/dL


(70-99) 


 


 142 mg/dL


(70-99)


 


Lactic Acid Level


 0.8 mmol/L


(0.4-2.0) 


 


 





 


Calcium Level


 8.8 mg/dL


(8.5-10.1) 


 


 8.8 mg/dL


(8.5-10.1)


 


Total Bilirubin


 0.8 mg/dL


(0.2-1.0) 


 


 





 


Aspartate Amino Transf


(AST/SGOT) 27 U/L (15-37) 


 


 


 





 


Alanine Aminotransferase


(ALT/SGPT) 47 U/L (14-59) 


 


 


 





 


Alkaline Phosphatase


 99 U/L


() 


 


 





 


Creatine Kinase


 214 U/L


() 


 


 





 


Troponin I Quantitative


 0.153 ng/mL


(0.000-0.055) 


 


 0.212 ng/mL


(0.000-0.055)


 


NT-Pro-B-Type Natriuretic


Peptide 2166 pg/mL


(0-124) 


 


 





 


Total Protein


 6.9 g/dL


(6.4-8.2) 


 


 





 


Albumin


 3.2 g/dL


(3.4-5.0) 


 


 





 


Albumin/Globulin Ratio 0.9 (1.0-1.7)    


 


Triglycerides Level


 46 mg/dL


(0-150) 


 


 





 


Cholesterol Level


 164 mg/dL


(0-200) 


 


 





 


LDL Cholesterol, Calculated


 106 mg/dL


(0-100) 


 


 





 


VLDL Cholesterol, Calculated 9 mg/dL (0-40)    


 


Non-HDL Cholesterol Calculated


 115 mg/dL


(0-129) 


 


 





 


HDL Cholesterol


 49 mg/dL


(40-60) 


 


 





 


Cholesterol/HDL Ratio 3.3    


 


Thyroid Stimulating Hormone


(TSH) 0.801 uIU/mL


(0.358-3.74) 


 


 





 


Nasal Screen MRSA (PCR)


 


 Negative


(Negative) 


 





 


Urine Collection Type   Unknown  


 


Urine Color   Yellow  


 


Urine Clarity   Clear  


 


Urine pH   6.5  


 


Urine Specific Gravity   <=1.005  


 


Urine Protein


 


 


 Negative mg/dL


(NEG-TRACE) 





 


Urine Glucose (UA)


 


 


 Negative mg/dL


(NEG) 





 


Urine Ketones (Stick)


 


 


 Negative mg/dL


(NEG) 





 


Urine Blood   Negative (NEG)  


 


Urine Nitrite   Negative (NEG)  


 


Urine Bilirubin   Negative (NEG)  


 


Urine Urobilinogen Dipstick


 


 


 0.2 mg/dL (0.2


mg/dL) 





 


Urine Leukocyte Esterase   Negative (NEG)  


 


Urine RBC   Occ /HPF (0-2)  


 


Urine WBC   Occ /HPF (0-4)  


 


Urine Squamous Epithelial


Cells 


 


 Occ /LPF 


 





 


Urine Bacteria   0 /HPF (0-FEW)  


 


Urine Opiates Screen   Neg (NEG)  


 


Urine Methadone Screen   Neg (NEG)  


 


Urine Barbiturates   Neg (NEG)  


 


Urine Phencyclidine Screen   Neg (NEG)  


 


Urine


Amphetamine/Methamphetamine 


 


 Neg (NEG) 


 





 


Urine Benzodiazepines Screen   Neg (NEG)  


 


Urine Cocaine Screen   Neg (NEG)  


 


Urine Cannabinoids Screen   Neg (NEG)  


 


Urine Ethyl Alcohol   Neg (NEG)  








Microbiology


4/9/19 Blood Culture - Preliminary, Resulted


         NO GROWTH AFTER 1 DAY


Medications





Current Medications


Sodium Chloride 1,000 ml @  1,000 mls/hr Q1H IV  Last administered on 4/9/19at 

12:25;  Start 4/9/19 at 11:45;  Stop 4/9/19 at 12:44;  Status DC


Albuterol/ Ipratropium (Duoneb) 3 ml 1X  ONCE NEB  Last administered on 4/9/ 19at 12:37;  Start 4/9/19 at 12:15;  Stop 4/9/19 at 12:16;  Status DC


Methylprednisolone Sodium Succinate (SOLU-Medrol 125MG VIAL) 150 mg 1X  ONCE IV

  Last administered on 4/9/19at 12:23;  Start 4/9/19 at 12:15;  Stop 4/9/19 at 

12:24;  Status DC


Hydralazine HCl (Apresoline Inj) 10 mg 1X  ONCE IVP  Last administered on 4/9/ 19at 12:24;  Start 4/9/19 at 12:15;  Stop 4/9/19 at 12:16;  Status DC


Methylprednisolone Sodium Succinate (SOLU-Medrol 125MG VIAL) 125 mg 1X  ONCE IV 

;  Start 4/9/19 at 12:30;  Stop 4/9/19 at 12:31;  Status DC


Hydralazine HCl (Apresoline Inj) 10 mg 1X  ONCE IVP  Last administered on 4/9/ 19at 13:36;  Start 4/9/19 at 13:30;  Stop 4/9/19 at 13:31;  Status DC


Nicardipine HCl 50 mg/Sodium Chloride 250 ml @  25 mls/hr CONT  PRN IV SEE I/O 

RECORD Last administered on 4/10/19at 01:22;  Start 4/9/19 at 13:30


Ferrous Sulfate (Feosol) 325 mg BID PO ;  Start 4/9/19 at 21:00;  Stop 4/9/19 

at 21:00;  Status DC


Gabapentin (Neurontin) 300 mg TID PO ;  Start 4/9/19 at 15:00;  Stop 4/9/19 at 

15:00;  Status DC


Oxycodone/ Acetaminophen (Percocet 7.5/ 325) 1 tab PRN Q4HRS  PRN PO pain relief

;  Start 4/9/19 at 14:00;  Status Cancel


Non-Formulary Medication (Azilsartan Med/ Chlorthalidone (Edarbyclor 40-25 Mg 

Tablet)) 1 each DAILY PO ;  Start 4/10/19 at 09:00;  Status UNV


Cyclobenzaprine HCl (Flexeril) 5 mg QHS PO ;  Start 4/9/19 at 21:00;  Stop 4/9/ 19 at 21:00;  Status DC


Multivitamins (Thera M Plus) 1 tab DAILY PO  Last administered on 4/9/19at 14:58

;  Start 4/9/19 at 15:00;  Stop 4/9/19 at 15:57;  Status DC


Non-Formulary Medication (Warfarin Sodium (Coumadin)) 1 tab DAILY PO ;  Start 4/

10/19 at 09:00;  Stop 4/10/19 at 09:00;  Status DC


Lisinopril (Prinivil) 10 mg DAILY PO ;  Start 4/9/19 at 15:00;  Stop 4/9/19 at 

15:00;  Status DC


Hydrochlorothiazide (Hydrodiuril) 25 mg DAILY PO  Last administered on 4/9/19at 

14:59;  Start 4/9/19 at 15:00;  Stop 4/9/19 at 15:34;  Status DC


Aspirin (Ecotrin) 325 mg DAILYWBKFT PO  Last administered on 4/11/19at 08:32;  

Start 4/9/19 at 15:00


Losartan Potassium (Cozaar) 50 mg DAILY PO ;  Start 4/9/19 at 15:00;  Stop 4/9/ 19 at 15:00;  Status DC


Chlorthalidone (Thalitone) 25 mg DAILY PO  Last administered on 4/11/19at 08:33

;  Start 4/9/19 at 15:00


Temazepam (Restoril) 7.5 mg PRN QHS  PRN PO INSOMNIA Last administered on 4/10/

19at 20:35;  Start 4/9/19 at 14:15


Nicotine (Nicoderm Cq 21mg) 1 patch PRN DAILY  PRN TD SMOKING CESSATION Last 

administered on 4/11/19at 08:34;  Start 4/9/19 at 14:15


Hydralazine HCl (Apresoline Inj) 10 mg PRN Q4HRS  PRN IVP ELEVATED BP, SEE 

COMMENTS Last administered on 4/11/19at 04:27;  Start 4/9/19 at 14:30


Ondansetron HCl (Zofran) 4 mg STK-MED ONCE .ROUTE ;  Start 4/9/19 at 14:32;  

Stop 4/9/19 at 14:33;  Status DC


Ondansetron HCl (Zofran) 4 mg PRN Q6HRS  PRN IV NAUSEA/VOMITING Last 

administered on 4/11/19at 06:01;  Start 4/9/19 at 14:45


Acetaminophen/ Hydrocodone Bitart (Lortab 7.5/325) 1 tab PRN Q6HRS  PRN PO PAIN 

Last administered on 4/11/19at 06:03;  Start 4/9/19 at 16:00


Amlodipine Besylate (Norvasc) 10 mg DAILY PO  Last administered on 4/10/19at 09:

00;  Start 4/9/19 at 17:00


Losartan Potassium (Cozaar) 100 mg DAILY PO  Last administered on 4/10/19at 20:

34;  Start 4/10/19 at 09:00


Pantoprazole Sodium (Protonix) 40 mg 1X  ONCE PO  Last administered on 4/9/19at 

17:20;  Start 4/9/19 at 17:00;  Stop 4/9/19 at 17:01;  Status DC


Pantoprazole Sodium (Protonix) 40 mg DAILYAC PO  Last administered on 4/11/19at 

08:33;  Start 4/10/19 at 07:30


Atorvastatin Calcium (Lipitor) 10 mg QHS PO  Last administered on 4/9/19at 20:50

;  Start 4/9/19 at 21:00


Hydralazine HCl (Apresoline) 50 mg TID PO  Last administered on 4/11/19at 08:32

;  Start 4/10/19 at 09:30


Isosorbide Mononitrate (Imdur) 30 mg DAILY PO ;  Start 4/11/19 at 09:00





Active Scripts


Active


Reported


Hydrocodone-Apap 7.5-325  ** (Hydrocodone Bit/Acetaminophen) 1 Tab Tablet 1 Tab 

PO PRN Q6HRS PRN


Edarbyclor 40-25 Mg Tablet (Azilsartan/Chlorthalidone) 1 Each Tablet 1 Each PO 

DAILY


Vitals/I & O





Vital Sign - Last 24 Hours








 4/10/19 4/10/19 4/10/19 4/10/19





 09:00 09:00 09:13 09:23


 


Pulse 59 56 58 58


 


Resp  28  


 


B/P (MAP) 158/58 158/67 (97) 158/67 158/59


 


Pulse Ox  94  





 4/10/19 4/10/19 4/10/19 4/10/19





 10:00 11:00 12:00 12:00


 


Temp   98.6 





   98.6 


 


Pulse 58 58 59 


 


Resp 26 22 25 


 


B/P (MAP) 147/58 (87) 147/50 (82) 146/72 (96) 


 


Pulse Ox  95 94 


 


O2 Delivery   Room Air Room Air


 


    





    





 4/10/19 4/10/19 4/10/19 4/10/19





 13:00 14:00 15:00 16:00


 


Pulse 54 54 52 


 


B/P (MAP) 155/55 (88) 151/56 (87) 159/68 (98) 


 


Pulse Ox 93 94  


 


O2 Delivery Room Air Room Air Room Air Room Air





 4/10/19 4/10/19 4/10/19 4/10/19





 16:00 16:10 16:12 16:14


 


Temp 98.6   





 98.6   


 


Pulse 54 54  57


 


Resp   23 


 


B/P (MAP) 171/88 (115) 171/77  163/70


 


Pulse Ox   94 


 


O2 Delivery Room Air  Room Air 


 


    





    





 4/10/19 4/10/19 4/10/19 4/10/19





 16:30 17:00 17:15 20:00


 


Pulse  55  59


 


Resp   23 20


 


B/P (MAP) 168/76 (106) 153/59 (90)  188/80 (116)


 


Pulse Ox    95


 


O2 Delivery  Room Air  Room Air


 


O2 Flow Rate  96.0  





 4/10/19 4/10/19 4/10/19 4/10/19





 20:00 20:34 20:34 22:00


 


Pulse  59 59 


 


B/P (MAP)  188/80 188/80 


 


O2 Delivery Room Air   Room Air





 4/10/19 4/10/19 4/11/19 4/11/19





 22:25 23:59 00:00 00:27


 


Temp   98.7 





   98.7 


 


Pulse 58  55 55


 


Resp 20  20 


 


B/P (MAP) 152/64 (93)  178/66 (103) 178/66


 


Pulse Ox 94  94 


 


O2 Delivery Room Air Room Air Room Air 


 


    





    





 4/11/19 4/11/19 4/11/19 4/11/19





 01:00 04:00 04:20 04:27


 


Temp   98.2 





   98.2 


 


Pulse 56  52 52


 


Resp 20  20 


 


B/P (MAP) 166/65 (98)  188/83 (118) 188/83


 


Pulse Ox 94  94 


 


O2 Delivery Room Air Room Air Room Air 


 


    





    





 4/11/19 4/11/19 4/11/19 4/11/19





 04:54 06:00 06:03 07:05


 


Pulse 52 51  


 


Resp   20 


 


B/P (MAP) 177/74 (108) 163/72 (102)  


 


Pulse Ox    94


 


O2 Delivery   Room Air Room Air


 


O2 Flow Rate    96.0





 4/11/19   





 08:32   


 


Pulse 51   


 


B/P (MAP) 163/72   














Intake and Output   


 


 4/10/19 4/10/19 4/11/19





 15:00 23:00 07:00


 


Intake Total 1045 ml 490 ml 50 ml


 


Output Total 2125 ml 1450 ml 1525 ml


 


Balance -1080 ml -960 ml -1475 ml

















SANTIAGO CROUCH MD Apr 11, 2019 08:48

## 2019-04-11 NOTE — PDOC
CARDIO Progress Notes


Date and Time


Date of Service


4/11/2019


Time of Evaluation


0850





Subjective


Subjective:  No Chest Pain, No shortness of breath, No Palpitations





Vitals


Vitals





Vital Signs








  Date Time  Temp Pulse Resp B/P (MAP) Pulse Ox O2 Delivery O2 Flow Rate FiO2


 


4/11/19 08:32  51  163/72    


 


4/11/19 07:05     94 Room Air 96.0 


 


4/11/19 06:03   20     


 


4/11/19 04:20 98.2       





 98.2       








Weight


Weight [ ]





Input and Output


Intake and Output











Intake and Output 


 


 4/11/19





 06:59


 


Intake Total 1585 ml


 


Output Total 5350 ml


 


Balance -3765 ml


 


 


 


Intake Oral 1560 ml


 


IV Total 25 ml


 


Output Urine Total 5350 ml











Microbiology


Micro





Microbiology


4/9/19 Blood Culture - Preliminary, Resulted


         NO GROWTH AFTER 1 DAY





Physical Exam


HEENT:  Neck Supple W Full Motion


Chest:  Symmetric


LUNGS:  Other (diminished bases)


Heart:  S1S2, RRR (SR/SB)


Abdomen:  Soft N/T


Extremities:  No Calf Tenderness, Other (2-3+ bilateral LE pitting edema)


Neurology:  alert, oriented, follow commands





Assessment


Assessment


1. Malignant HTN: due to Lifestyle issues and inconsistent use of BP meds and 

NSAID use.  BP improved


2. Acute on chronic diastolic CHF; EF and WM nml with mod pulmonary HTN, 

compensated


3. Elevated troponin: mild peaked at 0.2. Suspect demand mediated, type 2 as 

above. EKG SB no acute ST-T wave changes, no CP


4. Tobaccoism


5. HLP


6. Obesity


7. Asymptomatic sinus bradycardia: no pauses. 


8. Chronic high dose NSAID use with chronic low back pain. 6 tabs ibuprofen and 

6 tabs of aleve a day avg. 





Recommendations


1. renal duplex revealed 50% LRAS. No AV robbie blocking agents. Home with 

hydralazine/imdur/norvasc/norvasc/losartan


2. Lipitor and ASA


3. Smoking cessation


4. Discussed lifestyle modification. Wt loss, minimizing processed food, 

exercise, DASH diet, decreasing soda. Stop routine NSAID and only do PRN


5. Will need outpt WANDA workup. 


6. Encouraged to see ophthalmology routine appt. 


7. Plan for lexiscan as an outpt.


8. HBPM BID for 1 wk. Discussed with pt.  Follow up next week for BP check in 

our office then 5-6 weeks with Dr. Mcintosh. Home today











RUIZ ARROYO Apr 11, 2019 09:18

## 2019-04-11 NOTE — NUR
Discharge education provided for new medications and CHF. Upcoming appointments reviewed 
with patient and family member. Patient taken to car in wheelchair by RN.

## 2019-04-11 NOTE — PDOC3
Discharge Summary


Visit Information


Date of Admission:  Apr 9, 2019


Date of Discharge:  Apr 11, 2019


Admitting Diagnosis Comment:


Hypertensive emergency


Obesity, BMI 42


Noncompliance


History neck surgery-off gabapentin and flexeril


History of orthopedic surgery-off warfarin


Final Diagnosis


Problems


Medical Problems:


(1) CHF (congestive heart failure)


Status: Acute  





(2) Elevated troponin I level secondary to demand ischemia


Status: Acute  





(3) Malignant hypertension


Status: Acute  





(4) Shortness of breath secondary to the above


Status: Acute  








Brief Hospital Course


Allergies





 Allergies








Coded Allergies Type Severity Reaction Last Updated Verified


 


  No Known Drug Allergies    8/18/15 No








Vital Signs





Vital Signs








  Date Time  Temp Pulse Resp B/P (MAP) Pulse Ox O2 Delivery O2 Flow Rate FiO2


 


4/11/19 08:32  51  163/72    


 


4/11/19 08:00      Room Air  


 


4/11/19 08:00 98.6    96   





 98.6       


 


4/11/19 07:05       96.0 


 


4/11/19 06:03   20     








Lab Results





Laboratory Tests








Test


 4/9/19


14:50 4/9/19


16:05 4/10/19


03:50


 


Nasal Screen MRSA (PCR)


 Negative


(Negative) 


 





 


Urine Collection Type  Unknown  


 


Urine Color  Yellow  


 


Urine Clarity  Clear  


 


Urine pH  6.5  


 


Urine Specific Gravity  <=1.005  


 


Urine Protein


 


 Negative mg/dL


(NEG-TRACE) 





 


Urine Glucose (UA)


 


 Negative mg/dL


(NEG) 





 


Urine Ketones (Stick)


 


 Negative mg/dL


(NEG) 





 


Urine Blood  Negative (NEG)  


 


Urine Nitrite  Negative (NEG)  


 


Urine Bilirubin  Negative (NEG)  


 


Urine Urobilinogen Dipstick


 


 0.2 mg/dL (0.2


mg/dL) 





 


Urine Leukocyte Esterase  Negative (NEG)  


 


Urine RBC  Occ /HPF (0-2)  


 


Urine WBC  Occ /HPF (0-4)  


 


Urine Squamous Epithelial


Cells 


 Occ /LPF 


 





 


Urine Bacteria  0 /HPF (0-FEW)  


 


Urine Opiates Screen  Neg (NEG)  


 


Urine Methadone Screen  Neg (NEG)  


 


Urine Barbiturates  Neg (NEG)  


 


Urine Phencyclidine Screen  Neg (NEG)  


 


Urine


Amphetamine/Methamphetamine 


 Neg (NEG) 


 





 


Urine Benzodiazepines Screen  Neg (NEG)  


 


Urine Cocaine Screen  Neg (NEG)  


 


Urine Cannabinoids Screen  Neg (NEG)  


 


Urine Ethyl Alcohol  Neg (NEG)  


 


Sodium Level


 


 


 141 mmol/L


(136-145)


 


Potassium Level


 


 


 4.4 mmol/L


(3.5-5.1)


 


Chloride Level


 


 


 105 mmol/L


()


 


Carbon Dioxide Level


 


 


 29 mmol/L


(21-32)


 


Anion Gap   7 (6-14) 


 


Blood Urea Nitrogen


 


 


 17 mg/dL


(7-20)


 


Creatinine


 


 


 1.1 mg/dL


(0.6-1.0)


 


Estimated GFR


(Cockcroft-Gault) 


 


 62.6 





 


Glucose Level


 


 


 142 mg/dL


(70-99)


 


Calcium Level


 


 


 8.8 mg/dL


(8.5-10.1)


 


Troponin I Quantitative


 


 


 0.212 ng/mL


(0.000-0.055)








Brief Hospital Course


Ms. Anthony  is a 53 old female who presented with hypertensive emergency. She 

does not see a primary care physician regularly and has not been compliant with 

her medication at home. She will that she blood pressure on and off only when 

she experience headache or her chest felt like pressure. The patient was 

started on a Cardene drip in order to control her heart rate and blood pressure 

which initially was 240/130. The patient did not percent neurological deficits 

she had a mild elevation of troponin which was most likely demand ischemia from 

her significant malignant hypertension. The patient was given extensive 

counseling regarding the adverse effects of hypertension on her overall health. 

The patient acknowledged understanding of the instructions and she was provided 

with prescriptions for a new regimen noted to control her blood pressure 

better. This will contain hydralazine amlodipine and she'll continue with her 

chlorthalidone. She was also given Imdur. 


Signs and symptoms of alarm were discussed prior to discharge encouraged to 

continue with follow-up appointment in the outpatient setting with her primary 

care physician within one week





As part of her workup she had an echocardiogram done with the following results:








<Conclusion>


The Ejection Fraction is >55%. The left ventricular systolic function is normal 

and the ejection fraction is within normal range.


There is normal LV segmental wall motion.


There is moderate concentric left ventricular hypertrophy.


Doppler and Color Flow revealed trace tricuspid regurgitation with an estimated 

PAP of 43 mmHg.


The IVC is dilated and collapses <50% with inspiration.





Signed by : Hira Perdomo, 


Electronically Approved : 04/09/2019 16:10:49








She was in good spirits to be dismissed home 25 minutes spent in the discharge 

process the patient in counseling coordination of care and arrangements for a 

safe discharge





Gen.: well-developed well-nourished in no apparent distress


Head: Normal shape atraumatic


Eyes: Pupils equal reactive to light and accommodation, normal conjunctivae and 

lids


Ears: Normal shape


Nose: Normal shape no trauma


Mouth: No exudates of the back of throat no thrush no lesions


Neck: Supple no JVD no carotid bruit or lymphadenopathy no thyromegaly


Chest: Lungs clear to auscultation with good inspiratory effort no crackles 

rales or rhonchi


Cardiovascular: S1-S2 regular rhythm no murmurs gallops or rubs


Abdomen: Bowel sounds present soft nontender no hepatosplenomegaly appreciated 

sign Extremities: No clubbing no cyanosis no edema peripheral pulses palpated 

bilaterally


Neurological: Alert awake oriented in person time place and situation, cranial 

nerves II through XII intact, no motor or sensory deficits appreciated


Psych: Appropriate mood, cooperative





Discharge Information


Condition at Discharge:  Improved


Follow Up:  Weeks


Disposition/Orders:  D/C to Home


Scheduled


Amlodipine Besylate (Amlodipine Besylate) 10 Mg Tablet, 10 MG PO DAILY for htn 

for 30 Days, #30


   Prescribed by: SANTIAGO CROUCH MD on 4/11/19 1222


Aspirin (Aspirin Ec) 325 Mg Tablet.dr, 325 MG PO DAILYWBKFT for antiplatelet 

for 30 Days, #30


   Prescribed by: SANTIAGO CROUCH MD on 4/11/19 1222


Atorvastatin Calcium (Atorvastatin Calcium) 10 Mg Tablet, 10 MG PO QHS for 

dyslipidemia for 30 Days, #30


   Prescribed by: SANTIAGO CROUCH MD on 4/11/19 1222


Azilsartan Med/Chlorthalidone (Edarbyclor 40-25 Mg Tablet) 1 Each Tablet, 1 

EACH PO DAILY, (Reported)


   Entered as Reported by: NEGRA ENGEL on 7/30/15 1429


   Last Action: Converted on 4/9/19 1347 by BEN FALL


Hydralazine Hcl (Hydralazine Hcl) 50 Mg Tablet, 50 MG PO TID for htn for 30 Days

, #90


   Prescribed by: SANTIAGO CROUCH MD on 4/11/19 1222


Isosorbide Mononitrate (Isosorbide Mononitrate Er) 30 Mg Tab.er.24h, 30 MG PO 

DAILY for htn for 30 Days, #30


   Prescribed by: SANTIAGO CROUCH MD on 4/11/19 1222





Scheduled PRN


Hydrocodone Bit/Acetaminophen (Hydrocodone-Apap 7.5-325  **) 1 Tab Tablet, 1 

TAB PO PRN Q6HRS PRN for PAIN, Ref 0 (Reported)


   Entered as Reported by: TOMASZ GUAN on 4/9/19 1556


   Last Action: Continued on 4/9/19 1557 by TOMASZ GUAN





Discontinued Medications


Ferrous Sulfate (Ferrous Sulfate) 325 Mg Tablet, 1 TAB PO BID, #60 Ref 3 (

Reported)


   Entered as Reported by: ADONIS NASH on 8/18/15 1057


   Last Action: Discontinued on 4/9/19 1518 by TOMASZ GUAN


Multivitamin (Multivitamins) 1 Each Tablet, 1 EACH PO DAILY, (Reported)


   Entered as Reported by: NEGRA ENGEL on 7/30/15 1429


   Last Action: Discontinued on 4/9/19 1518 by SANTIAGO YUAN MD Apr 11, 2019 12:35

## 2019-04-11 NOTE — RAD
MR#: [id number]

Account#: [id number]

Accession#: [id number]

Date of Study: 04/11/2019

Ordering Physician: RUIZ ARROYO,

Referring Physician: BEN FALL

Tech: Tamiko Marcelino BS, RTR, RDMS, RVT





--------------- APPROVED REPORT --------------





Patient Location: IN-PATIENT



Indications

Uncontrolled HTN 



Risk Factors

Hypertension

Obesity



Renal Artery Doppler 

                                Right Renal Artery                                Left Renal Arter
y

Proximal                        200.0/27.0 cm/secProximal                        231.0/33.0 cm/sec


Mid                             185.0/32.0 cm/secMid                             216.0/38.0 cm/sec


Distal                          88.0/17.0 cm/secDistal                          350.0/87.0 cm/sec


Renal/Aorta Ratio               0.00Renal/Aorta Ratio                2.92

Prox. Resistive Index            0.87Prox. Resistive Index             0.86

Mid Resistive Index               0.83Mid Resistive Index               0.82

Distal Resistive Index            0.81Distal Resistive Index            0.87



Renal Measurements

RightLeft

Kidney Dtgizv36.8 cm   cmKidney Aderit24.2 cm   cm

Right Additional FindingsLeft Additional Findings

lk mid/inf cyst 2.4 x 2.7 x 2.1 cm



Findings

Grayscale images of the bilateral kidneys demonstrate patent renal veins and a left kidney inferior/m
id cyst measuring approximately 2.4 x 2.7 x 2.1 cm.



Spectral waveforms and color Doppler of the right renal artery are grossly within normal limits with 
mildly elevated proximal velocities. Overall normal renal to aortic ratios.



Velocities are mildly elevated at the proximal left renal artery and moderately elevated at the dista
l left renal artery suggestive of greater than 50% stenosis. Due to patient difficulty with holding h
er breath the images are limited and spectral waveforms are not clear.



Critical Notification

Critical Value: No



<Conclusion>

1. No significant right renal artery stenosis.

2. Probable 50% stenosis involving the mid to distal left renal artery although images are limited.



Signed by : Hira Perdomo, 

Electronically Approved : 04/11/2019 09:00:36

## 2019-04-24 ENCOUNTER — HOSPITAL ENCOUNTER (INPATIENT)
Dept: HOSPITAL 61 - ER | Age: 54
LOS: 2 days | Discharge: HOME | DRG: 287 | End: 2019-04-26
Attending: INTERNAL MEDICINE | Admitting: INTERNAL MEDICINE
Payer: COMMERCIAL

## 2019-04-24 VITALS — BODY MASS INDEX: 39.28 KG/M2 | HEIGHT: 64 IN | WEIGHT: 230.06 LBS

## 2019-04-24 DIAGNOSIS — E78.5: ICD-10-CM

## 2019-04-24 DIAGNOSIS — K21.9: ICD-10-CM

## 2019-04-24 DIAGNOSIS — Z87.11: ICD-10-CM

## 2019-04-24 DIAGNOSIS — F41.9: ICD-10-CM

## 2019-04-24 DIAGNOSIS — Z91.19: ICD-10-CM

## 2019-04-24 DIAGNOSIS — Z96.641: ICD-10-CM

## 2019-04-24 DIAGNOSIS — I50.33: ICD-10-CM

## 2019-04-24 DIAGNOSIS — Z83.3: ICD-10-CM

## 2019-04-24 DIAGNOSIS — E66.9: ICD-10-CM

## 2019-04-24 DIAGNOSIS — Z82.49: ICD-10-CM

## 2019-04-24 DIAGNOSIS — F17.210: ICD-10-CM

## 2019-04-24 DIAGNOSIS — I11.0: Primary | ICD-10-CM

## 2019-04-24 DIAGNOSIS — G89.29: ICD-10-CM

## 2019-04-24 DIAGNOSIS — M19.90: ICD-10-CM

## 2019-04-24 DIAGNOSIS — Z79.899: ICD-10-CM

## 2019-04-24 DIAGNOSIS — Z71.6: ICD-10-CM

## 2019-04-24 DIAGNOSIS — R07.89: ICD-10-CM

## 2019-04-24 LAB
ALBUMIN SERPL-MCNC: 3.5 G/DL (ref 3.4–5)
ALBUMIN/GLOB SERPL: 0.9 {RATIO} (ref 1–1.7)
ALP SERPL-CCNC: 108 U/L (ref 46–116)
ALT SERPL-CCNC: 43 U/L (ref 14–59)
ANION GAP SERPL CALC-SCNC: 10 MMOL/L (ref 6–14)
AST SERPL-CCNC: 21 U/L (ref 15–37)
BASOPHILS # BLD AUTO: 0.1 X10^3/UL (ref 0–0.2)
BASOPHILS NFR BLD: 1 % (ref 0–3)
BILIRUB SERPL-MCNC: 0.3 MG/DL (ref 0.2–1)
BUN SERPL-MCNC: 14 MG/DL (ref 7–20)
BUN/CREAT SERPL: 14 (ref 6–20)
CALCIUM SERPL-MCNC: 9.5 MG/DL (ref 8.5–10.1)
CHLORIDE SERPL-SCNC: 101 MMOL/L (ref 98–107)
CK SERPL-CCNC: 146 U/L (ref 26–192)
CK SERPL-CCNC: 161 U/L (ref 26–192)
CO2 SERPL-SCNC: 27 MMOL/L (ref 21–32)
CREAT SERPL-MCNC: 1 MG/DL (ref 0.6–1)
EOSINOPHIL NFR BLD: 0.4 X10^3/UL (ref 0–0.7)
EOSINOPHIL NFR BLD: 4 % (ref 0–3)
ERYTHROCYTE [DISTWIDTH] IN BLOOD BY AUTOMATED COUNT: 14.1 % (ref 11.5–14.5)
GFR SERPLBLD BASED ON 1.73 SQ M-ARVRAT: 69.9 ML/MIN
GLOBULIN SER-MCNC: 3.9 G/DL (ref 2.2–3.8)
GLUCOSE SERPL-MCNC: 92 MG/DL (ref 70–99)
HCT VFR BLD CALC: 46.7 % (ref 36–47)
HGB BLD-MCNC: 15.5 G/DL (ref 12–15.5)
LYMPHOCYTES # BLD: 1.6 X10^3/UL (ref 1–4.8)
LYMPHOCYTES NFR BLD AUTO: 19 % (ref 24–48)
MAGNESIUM SERPL-MCNC: 2 MG/DL (ref 1.8–2.4)
MCH RBC QN AUTO: 31 PG (ref 25–35)
MCHC RBC AUTO-ENTMCNC: 33 G/DL (ref 31–37)
MCV RBC AUTO: 92 FL (ref 79–100)
MONO #: 0.5 X10^3/UL (ref 0–1.1)
MONOCYTES NFR BLD: 6 % (ref 0–9)
NEUT #: 5.9 X10^3UL (ref 1.8–7.7)
NEUTROPHILS NFR BLD AUTO: 70 % (ref 31–73)
PLATELET # BLD AUTO: 229 X10^3/UL (ref 140–400)
POTASSIUM SERPL-SCNC: 3.7 MMOL/L (ref 3.5–5.1)
PROT SERPL-MCNC: 7.4 G/DL (ref 6.4–8.2)
PROTHROMBIN TIME: 11.9 SEC (ref 11.7–14)
RBC # BLD AUTO: 5.05 X10^6/UL (ref 3.5–5.4)
SODIUM SERPL-SCNC: 138 MMOL/L (ref 136–145)
WBC # BLD AUTO: 8.4 X10^3/UL (ref 4–11)

## 2019-04-24 PROCEDURE — C1892 INTRO/SHEATH,FIXED,PEEL-AWAY: HCPCS

## 2019-04-24 PROCEDURE — 80053 COMPREHEN METABOLIC PANEL: CPT

## 2019-04-24 PROCEDURE — 71275 CT ANGIOGRAPHY CHEST: CPT

## 2019-04-24 PROCEDURE — 99153 MOD SED SAME PHYS/QHP EA: CPT

## 2019-04-24 PROCEDURE — 93005 ELECTROCARDIOGRAM TRACING: CPT

## 2019-04-24 PROCEDURE — 84484 ASSAY OF TROPONIN QUANT: CPT

## 2019-04-24 PROCEDURE — 99152 MOD SED SAME PHYS/QHP 5/>YRS: CPT

## 2019-04-24 PROCEDURE — 96375 TX/PRO/DX INJ NEW DRUG ADDON: CPT

## 2019-04-24 PROCEDURE — 80048 BASIC METABOLIC PNL TOTAL CA: CPT

## 2019-04-24 PROCEDURE — C1769 GUIDE WIRE: HCPCS

## 2019-04-24 PROCEDURE — 96374 THER/PROPH/DIAG INJ IV PUSH: CPT

## 2019-04-24 PROCEDURE — 82553 CREATINE MB FRACTION: CPT

## 2019-04-24 PROCEDURE — 83880 ASSAY OF NATRIURETIC PEPTIDE: CPT

## 2019-04-24 PROCEDURE — 85610 PROTHROMBIN TIME: CPT

## 2019-04-24 PROCEDURE — 85025 COMPLETE CBC W/AUTO DIFF WBC: CPT

## 2019-04-24 PROCEDURE — 75625 CONTRAST EXAM ABDOMINL AORTA: CPT

## 2019-04-24 PROCEDURE — 36415 COLL VENOUS BLD VENIPUNCTURE: CPT

## 2019-04-24 PROCEDURE — 93458 L HRT ARTERY/VENTRICLE ANGIO: CPT

## 2019-04-24 PROCEDURE — 82550 ASSAY OF CK (CPK): CPT

## 2019-04-24 PROCEDURE — 83735 ASSAY OF MAGNESIUM: CPT

## 2019-04-24 RX ADMIN — NITROGLYCERIN PRN MG: 0.4 TABLET SUBLINGUAL at 20:17

## 2019-04-24 RX ADMIN — NITROGLYCERIN PRN MG: 0.4 TABLET SUBLINGUAL at 20:09

## 2019-04-24 RX ADMIN — NITROGLYCERIN PRN MG: 0.4 TABLET SUBLINGUAL at 20:28

## 2019-04-24 NOTE — HP
ADMIT DATE:  04/24/2019



CHIEF COMPLAINT:  Shortness of breath and chest discomfort.



HISTORY OF PRESENT ILLNESS:  The patient is a pleasant 53-year-old female who

states she had a stress test last week and states it was probably negative. 

Now, she presents again with chest pain and shortness of breath.  It is in the

left shoulder blade, worse with moving, better with sitting still.  They gave

her some nitro that helped a little bit.  Interestingly when we checked her

troponin, it was slightly high at 0.135.  I have discussed the case with the ER

physician.  We are going to admit the patient and consult Cardiology.



PAST MEDICAL HISTORY:  Hyperlipidemia, hypertension, previous elevations of her

troponin and chronic pain.



ALLERGIES:  None.



FAMILY HISTORY:  Coronary artery disease.



SOCIAL HISTORY:  She does not drink, smoke or take drugs.



MEDICATIONS:  Reviewed, please refer to the MRAD.  She is on atorvastatin,

hydralazine, Imdur, amlodipine, aspirin and hydrocodone.



REVIEW OF SYSTEMS:  

GENERAL:  No history of weight change, weakness or fevers.

SKIN:  No bruising, hair changes or rashes.

EYES:  No blurred, double or loss of vision.

NOSE AND THROAT:  No history of nosebleeds, hoarseness or sore throat.

HEART:  She complains of chest pain.

LUNGS:  Denies cough, hemoptysis, wheezing or shortness of breath.

GASTROINTESTINAL:  Denies changes in appetite, nausea, vomiting, diarrhea or

constipation.

GENITOURINARY:  No history of frequency, urgency, hesitancy or nocturia.

NEUROLOGIC:  Denies history of numbness, tingling, tremor or weakness.

PSYCHIATRIC:  No history of panic, anxiety or depression.

ENDOCRINE:  No history of heat or cold intolerance, polyuria or polydipsia.

EXTREMITIES:  Denies muscle weakness, joint pain, pain on walking or stiffness.



PHYSICAL EXAMINATION:

VITAL SIGNS:  Temperature 99, pulse 80, respirations 18 and blood pressure

224/86.

GENERAL:  She is alert and cooperative.

HEART:  Normal S1 and S2.

LUNGS:  Clear.

ABDOMEN:  Soft.

EXTREMITIES:  Trace edema.

SKIN:  No rash.

ENDOCRINE:  No thyromegaly.

LYMPHATICS:  No cervical nodes.

HEMATOPOIETIC:  No bruises.

PSYCHIATRIC:  She is stable.



LABORATORY DATA:  Troponin is 0.135.  EKG is sinus rhythm.



ASSESSMENT AND PLAN:  Malignant hypertension with elevated troponin and chest

discomfort.  The patient has been admitted.  We will give her p.r.n.

antihypertensives.  Consult Cardiology, cardiac monitoring, serial enzymes,

serial EKGs, full code, DVT prophylaxis, home medications.

 



______________________________

RIMMA SIMMONS DO



DR:  DONNA/ronit  JOB#:  [id number] / [id number]

DD:  04/24/2019 22:12  DT:  04/24/2019 22:47

## 2019-04-24 NOTE — PHYS DOC
Past Medical History


Past Medical History:  Hypertension


 (LISA CORNELIUS)


Past Surgical History:  Hip Replacement, Other


Additional Past Surgical Histo:  DISC REPLACEMENT,CARDIAC CATH


 (LISA CORNELIUS)


Additional Information:  


1 PPD


Alcohol Use:  None


Drug Use:  None


 (LISA CORNELIUS)





Adult General


Chief Complaint


Chief Complaint:  SHORTNESS OF BREATH





HPI


HPI





Patient is a 53  year old F who is here with chest pain, pain in her L shoulder 

blade, hypertension and SOB.  Pt was admitted here on 4/9/19 with malignant 

hypertension and elevated troponin.  She underwent a stress test which was 

reassuring, an ECHO with an EF of >55% and was dx with CHF and felt to have 

demand ischemia related to her blood pressures. She did not undergo a cardiac 

cath (this was entered in error by nursing staff on triage in ER). 





Pt states she has continued to have SOB and some CP at home and is scheduled to 

see her PCP tomorrow for follow up but today had increased chest pain and pain 

in the shoulder blade that she describes as "sharp" and on arrival her blood 

pressure is very elevated at 224/86.                 . 





Pt was given ASA 325mg and Nitro 0.4mg SL x3 and did feel some relief and blood 

pressure improved.   





Her troponin is 0.135 which is down from her most recent troponin of 0.212 on 

4/10/19, but not sure if this is trending down or back up.   


 (LISA CORNELIUS)





Review of Systems


Review of Systems





Constitutional: Denies fever or chills 


Respiratory: Denies cough. Reports SOB


Cardiovascular: Reports chest pain


GI: Denies abdominal pain, nausea, vomiting, bloody stools or diarrhea 


: Denies dysuria or hematuria 


Musculoskeletal: Reports pain in L shoulder blade


Integument: Denies rash or skin lesions 


Neurologic: Denies headache, focal weakness or sensory changes 








All other systems were reviewed and found to be within normal limits, except as 

documented in this note.


 (LISA CORNELIUS)





Current Medications


Current Medications





Current Medications








 Medications


  (Trade)  Dose


 Ordered  Sig/Phu  Start Time


 Stop Time Status Last Admin


Dose Admin


 


 Aspirin


  (Children'S


 Aspirin)  324 mg  1X  ONCE  4/24/19 19:15


 4/24/19 19:16 DC 4/24/19 19:24


324 MG


 


 Iohexol


  (Omnipaque 350


 Mg/ml)  100 ml  1X  ONCE  4/24/19 20:45


 4/24/19 20:46 DC 4/24/19 21:03


100 ML


 


 Nitroglycerin


  (Nitrostat)  0.4 mg  PRN Q5MIN  PRN  4/24/19 19:30


    4/24/19 20:28


0.4 MG





 (NISA ALONZO DO)





Allergies


Allergies





Allergies








Coded Allergies Type Severity Reaction Last Updated Verified


 


  No Known Drug Allergies    8/18/15 No





 (NISA ALONZO DO)





Physical Exam


Physical Exam





Constitutional: Well developed, well nourished, no acute distress, non-toxic 

appearance.  


Neck: Normal range of motion, no tenderness, supple, no stridor.  


Cardiovascular:Heart rate regular rhythm, no murmur 


Lungs & Thorax:  Bilateral breath sounds clear to auscultation 


Abdomen: Bowel sounds normal, soft, no tenderness, no masses, no pulsatile 

masses.  


Skin: Warm, dry, no erythema, no rash. 2+ edema in B lower legs 


Back: No tenderness, no CVA tenderness.  


Extremities: No tenderness, no cyanosis, no clubbing, ROM intact. 2+ edema in B 

lower legs 


Neurologic: Alert and oriented X 3, normal motor function, normal sensory 

function, no focal deficits noted. 


Psychologic: Affect normal, judgement normal, mood normal. 


 (LISA CORNELIUS)





Current Patient Data


Vital Signs





                                   Vital Signs








  Date Time  Temp Pulse Resp B/P (MAP) Pulse Ox O2 Delivery O2 Flow Rate FiO2


 


4/24/19 21:39  64  208/79 (122)  Room Air  


 


4/24/19 18:52 99.3  24  98   





 99.3       





 (NISA ALONZO DO)


Lab Values





                                Laboratory Tests








Test


 4/24/19


19:45


 


White Blood Count


 8.4 x10^3/uL


(4.0-11.0)


 


Red Blood Count


 5.05 x10^6/uL


(3.50-5.40)


 


Hemoglobin


 15.5 g/dL


(12.0-15.5)


 


Hematocrit


 46.7 %


(36.0-47.0)


 


Mean Corpuscular Volume


 92 fL ()





 


Mean Corpuscular Hemoglobin 31 pg (25-35)  


 


Mean Corpuscular Hemoglobin


Concent 33 g/dL


(31-37)


 


Red Cell Distribution Width


 14.1 %


(11.5-14.5)


 


Platelet Count


 229 x10^3/uL


(140-400)


 


Neutrophils (%) (Auto) 70 % (31-73)  


 


Lymphocytes (%) (Auto) 19 % (24-48)  L


 


Monocytes (%) (Auto) 6 % (0-9)  


 


Eosinophils (%) (Auto) 4 % (0-3)  H


 


Basophils (%) (Auto) 1 % (0-3)  


 


Neutrophils # (Auto)


 5.9 x10^3uL


(1.8-7.7)


 


Lymphocytes # (Auto)


 1.6 x10^3/uL


(1.0-4.8)


 


Monocytes # (Auto)


 0.5 x10^3/uL


(0.0-1.1)


 


Eosinophils # (Auto)


 0.4 x10^3/uL


(0.0-0.7)


 


Basophils # (Auto)


 0.1 x10^3/uL


(0.0-0.2)


 


Prothrombin Time


 11.9 SEC


(11.7-14.0)


 


Prothrombin Time INR 0.9 (0.8-1.1)  


 


Sodium Level


 138 mmol/L


(136-145)


 


Potassium Level


 3.7 mmol/L


(3.5-5.1)


 


Chloride Level


 101 mmol/L


()


 


Carbon Dioxide Level


 27 mmol/L


(21-32)


 


Anion Gap 10 (6-14)  


 


Blood Urea Nitrogen


 14 mg/dL


(7-20)


 


Creatinine


 1.0 mg/dL


(0.6-1.0)


 


Estimated GFR


(Cockcroft-Gault) 69.9  





 


BUN/Creatinine Ratio 14 (6-20)  


 


Glucose Level


 92 mg/dL


(70-99)


 


Calcium Level


 9.5 mg/dL


(8.5-10.1)


 


Magnesium Level


 2.0 mg/dL


(1.8-2.4)


 


Total Bilirubin


 0.3 mg/dL


(0.2-1.0)


 


Aspartate Amino Transferase


(AST) 21 U/L (15-37)





 


Alanine Aminotransferase (ALT)


 43 U/L (14-59)





 


Alkaline Phosphatase


 108 U/L


()


 


Creatine Kinase


 161 U/L


()


 


Creatine Kinase MB (Mass)


 3.3 ng/mL


(0.0-3.6)


 


Creatine Kinase MB Relative


Index 2.0 % (0-4)  





 


Troponin I Quantitative


 0.135 ng/mL


(0.000-0.055)


 


NT-Pro-B-Type Natriuretic


Peptide 494 pg/mL


(0-124)  H


 


Total Protein


 7.4 g/dL


(6.4-8.2)


 


Albumin


 3.5 g/dL


(3.4-5.0)


 


Albumin/Globulin Ratio


 0.9 (1.0-1.7)


L





                                Laboratory Tests


4/24/19 19:45








                                Laboratory Tests


4/24/19 19:45








 (NISA ALONZO DO)





EKG


EKG


NSR, 70bpm, no STEMI


 (LISA CORNELIUS)





Radiology/Procedures


Radiology/Procedures


CTA chest done to check for PE and neg. 





small pleural effusion noted


 (LISA CORNELIUS)





Course & Med Decision Making


Course & Med Decision Making


Pertinent Labs and Imaging studies reviewed. (See chart for details)





Pt's pain mostly resolved with Nitro x3.  She reports it 1/10.  Her BP improved 

but then bumped back up.  She was given IV hydralazine and then IV labetolol.  

Pt declined pain medicine stating she was feeling better.  Pt does have an appt 

with her PCP tomorrow and was hoping to go home but discussed that I felt this 

was not a good idea with her increased chest pain today, hypertension and 

elevated troponin (possibly trending down).   I recommended admission for 

further care and she agreed. 


 (LISA CORNELIUS)





Dragon Disclaimer


Dragon Disclaimer


This electronic medical record was generated, in whole or in part, using a voice

recognition dictation system.


 (LISA CORNELIUS)





Departure


Departure


Impression:  


   Primary Impression:  


   Chest pain


   Additional Impressions:  


   Hypertension


   Elevated troponin


Disposition:  09 ADMITTED AS INPATIENT


Admitting Physician:  Chidi Del Real


 (NISA ALONZO DO)


Condition:  GUARDED


Referrals:  


MACIE FAYE MD (PCP)





Attending Signature


Attending Signature


I have reviewed the PA/NP's note and plan of care. I was available for 

consultation as needed during the patient's visit in the emergency department. I

agree with the clinical impression, plan, and disposition.


 (NISA ALONZO DO)





Problem Qualifiers











LISA CORNELIUS      Apr 24, 2019 21:55


ALONZO,NISA KATE DO             Apr 25, 2019 05:45

## 2019-04-24 NOTE — RAD
CTA Chest with contrast:

 

Clinical History: cp, soa, abcl761 100ml, no priors Shortness of breath.

 

Axial helical images of the chest were obtained after the administration 

of 100 cc of IV Omni 350 and timed appropriately for a pulmonary arterial 

study.  Conventional axial reconstruction was performed in addition to 

coronal, sagittal and bilateral oblique MIP (maximum intensity 

projection).  This study was ordered to detect possible pulmonary 

embolism.

 

There are no filling defects to suggest pulmonary embolism. 

 

 

There is a tiny left effusion.

There is no mediastinal or hilar lymphadenopathy.

 

The thoracic aorta appears normal.

 

Impression:

 

1.  No evidence of pulmonary embolism.

2.  Tiny left pleural effusion.

 

PQRS Compliance Statement:

 

One or more of the following individualized dose reduction techniques were

utilized for this examination:  

1. Automated exposure control  

2. Adjustment of the mA and/or kV according to patient size  

3. Use of iterative reconstruction technique

 

Electronically signed by: Quintin Jovel III, MD (4/24/2019 10:23 PM) [facility]

## 2019-04-25 VITALS — SYSTOLIC BLOOD PRESSURE: 172 MMHG | DIASTOLIC BLOOD PRESSURE: 67 MMHG

## 2019-04-25 VITALS — DIASTOLIC BLOOD PRESSURE: 65 MMHG | SYSTOLIC BLOOD PRESSURE: 148 MMHG

## 2019-04-25 VITALS — SYSTOLIC BLOOD PRESSURE: 137 MMHG | DIASTOLIC BLOOD PRESSURE: 60 MMHG

## 2019-04-25 VITALS — DIASTOLIC BLOOD PRESSURE: 76 MMHG | SYSTOLIC BLOOD PRESSURE: 187 MMHG

## 2019-04-25 VITALS — DIASTOLIC BLOOD PRESSURE: 70 MMHG | SYSTOLIC BLOOD PRESSURE: 174 MMHG

## 2019-04-25 VITALS — DIASTOLIC BLOOD PRESSURE: 72 MMHG | SYSTOLIC BLOOD PRESSURE: 162 MMHG

## 2019-04-25 VITALS — DIASTOLIC BLOOD PRESSURE: 64 MMHG | SYSTOLIC BLOOD PRESSURE: 143 MMHG

## 2019-04-25 RX ADMIN — FUROSEMIDE SCH MG: 40 TABLET ORAL at 09:33

## 2019-04-25 RX ADMIN — ASPIRIN SCH MG: 325 TABLET, DELAYED RELEASE ORAL at 09:33

## 2019-04-25 RX ADMIN — CHLORTHALIDONE SCH MG: 25 TABLET ORAL at 09:34

## 2019-04-25 RX ADMIN — LOSARTAN POTASSIUM SCH MG: 50 TABLET ORAL at 09:34

## 2019-04-25 NOTE — NUR
SS following for discharge planning. SS reviewed pt chart. Pt is from home and is currently 
on room air. No discharge needs noted at this time. SS will continue to follow for discharge 
planning.

## 2019-04-25 NOTE — EKG
[facility]

              [address] [place] [address]

Test Date:    2019               Test Time:    18:58:18

Pat Name:     DARA MNEA            Department:   

Patient ID:   [id number]           Room:         262 1

Gender:       F                        Technician:   

:          [id number]               Requested By: LISA CORNELIUS

Order Number: [id number]           Reading MD:   Pasquale Gomez

                                 Measurements

Intervals                              Axis          

Rate:         70                       P:            30

NJ:           166                      QRS:          40

QRSD:         80                       T:            41

QT:           418                                    

QTc:          454                                    

                           Interpretive Statements

SINUS RHYTHM

LEFT ATRIAL ABNORMALITY

ABNORMAL ECG

RI6.01          Unconfirmed report

Compared to ECG 2019 11:43:34

Atrial abnormality now present



Electronically Signed On 2019 11:43:02 CDT by Pasquale Gomez

## 2019-04-25 NOTE — NUR
pt arrived to unit per cart pt ambulated to restroom and then to bed. Pt denies pain at time 
of assessment poc explained vs obtained and stable will resume care and continue to monitor 
pt.call light in reach.

## 2019-04-25 NOTE — PDOC2
RUIZ ARROYO APRN 4/25/19 1319:


CARDIAC CONSULT


DATE OF CONSULT


Date of Consult


DATE: 4/25/19 


TIME: 12:48





REASON FOR CONSULT


Reason for Consult:


stepan-Manuela





REFERRING PHYSICIAN


Referring Physician:


Chest pain, HTN





SOURCE


Source:  Chart review, Patient





HISTORY OF PRESENT ILLNESS


HISTORY OF PRESENT ILLNESS


This is a pleasant 52 yo female admitted for complains of chest pain and high 

BP.  Reports that she was told to go to ED due to continued chest pain and also 

with high BP.  she noted her SBP at home at 170s.  Upon admission she was noted 

with SBP at 224.  She was here at [facility] about 2 weeks ago and her BP and trop were

elevated at that time and also was having SOA and CP and all of these were t

reated and actually had a stress test recently which showed no reversible 

defects.  She continues to have KEARNS and dull ghulam midchest pain that radiates to

her back with some nausea. No palpitations. No recent falls or injury.  She has 

been taking her medications regularly but has not been taking hydralazine 

because she was confused on often she needs to take it and hasonly been taking 

it daily.





PAST MEDICAL HISTORY


Past Medical History


Cardiovascular:  HTN, Hyperlipidemia, Valve insufficiency, Pulmonary 

hypertension


Pulmonary:  No pertinent hx


CENTRAL NERVOUS SYSTEM:  Other (None)


GI:  GERD, Peptic Ulcer disease


Heme/Onc:  No pertinent hx


Hepatobiliary:  No pertinent hx


Psych:  No pertinent hx


Musculoskeletal:   low back pain, Osteoarthritis, Other (lumbar and cervical 

stenosis)


Rheumatologic:  No pertinent hx


Infectious disease:  No pertinent hx


ENT:  No pertinent hx


Renal/:  No pertinent hx


Endocrine:  No pertinent hx


Dermatology:  No pertinent hx





PAST SURGICAL HISTORY


Past Surgical History


Total hip replacement (right), Other (cervical fusion)





FAMILY HISTORY


Family History


 Diabetes (father), Heart Disease (mother; sister with cardiac dysrhythmia and 

ICD)





SOCIAL HISTORY


Social History


Smoke:  1 pack per day (>30 yrs)


ALCOHOL:  none


Drugs:  None


Lives:  with Family





CURRENT MEDICATIONS


CURRENT MEDICATIONS





Current Medications








 Medications


  (Trade)  Dose


 Ordered  Sig/Phu


 Route


 PRN Reason  Start Time


 Stop Time Status Last Admin


Dose Admin


 


 Aspirin


  (Children'S


 Aspirin)  324 mg  1X  ONCE


 PO


   4/24/19 19:15


 4/24/19 19:16 DC 4/24/19 19:24





 


 Nitroglycerin


  (Nitrostat)  0.4 mg  PRN Q5MIN  PRN


 SL


 CHEST PAIN  4/24/19 19:30


    4/24/19 20:28





 


 Iohexol


  (Omnipaque 350


 Mg/ml)  100 ml  1X  ONCE


 IV


   4/24/19 20:45


 4/24/19 20:46 DC 4/24/19 21:03





 


 Hydralazine HCl


  (Apresoline Inj)  10 mg  1X  ONCE


 IVP


   4/24/19 22:30


 4/24/19 22:31 DC 4/24/19 22:55





 


 Furosemide


  (Lasix)  40 mg  DAILY


 PO


   4/25/19 09:00


    4/25/19 09:33





 


 Amlodipine


 Besylate


  (Norvasc)  10 mg  DAILY


 PO


   4/25/19 09:00


    4/25/19 09:34





 


 Aspirin


  (Ecotrin)  325 mg  DAILYWBKFT


 PO


   4/25/19 08:00


    4/25/19 09:33





 


 Isosorbide


 Mononitrate


  (Imdur)  30 mg  DAILY


 PO


   4/25/19 09:00


    4/25/19 09:33





 


 Losartan Potassium


  (Cozaar)  50 mg  DAILY


 PO


   4/25/19 09:00


    4/25/19 09:34





 


 Hydralazine HCl


  (Apresoline)  50 mg  TID


 PO


   4/25/19 09:00


    4/25/19 09:34





 


 Chlorthalidone


  (Thalitone)  25 mg  DAILY


 PO


   4/25/19 09:00


    4/25/19 09:34





 


 Labetalol HCl


  (Normodyne Iv


 Push)  5 mg  1X  ONCE


 IVP


   4/25/19 00:00


 4/25/19 00:01 DC 4/24/19 23:51














ALLERGIES


ALLERGIES:  


Coded Allergies:  


     No Known Drug Allergies (Unverified , 8/18/15)





ROS


Review of System


14 point ROS evaluated with pertinent positives noted per HPI





PHYSICAL EXAM


General:  Alert, Oriented X3, Cooperative, No acute distress


HEENT:  Atraumatic, Mucous membr. moist/pink


Lungs:  Clear to auscultation, Normal air movement


Heart:  Regular rate (SR), Normal S1, Normal S2, Other (2/6 systolic murmur to 

LLS border)


Extremities:  No cyanosis, Other (1+ bilateral LE pitting edema)


Skin:  No breakdown, No significant lesion


Neuro:  Normal speech, Sensation intact


Psych/Mental Status:  Mental status NL, Mood NL


MUSCULOSKELETAL:  Osteoarthritic changes both hands





VITALS


VITALS





Vital Signs








  Date Time  Temp Pulse Resp B/P (MAP) Pulse Ox O2 Delivery O2 Flow Rate FiO2


 


4/25/19 11:00 98.0 66 18 162/72 (102) 94 Room Air  





 98.0       











LABS


Lab:





Laboratory Tests








Test


 4/24/19


19:45 4/25/19


01:00 4/25/19


07:00


 


White Blood Count


 8.4 x10^3/uL


(4.0-11.0) 


 





 


Red Blood Count


 5.05 x10^6/uL


(3.50-5.40) 


 





 


Hemoglobin


 15.5 g/dL


(12.0-15.5) 


 





 


Hematocrit


 46.7 %


(36.0-47.0) 


 





 


Mean Corpuscular Volume 92 fL ()   


 


Mean Corpuscular Hemoglobin 31 pg (25-35)   


 


Mean Corpuscular Hemoglobin


Concent 33 g/dL


(31-37) 


 





 


Red Cell Distribution Width


 14.1 %


(11.5-14.5) 


 





 


Platelet Count


 229 x10^3/uL


(140-400) 


 





 


Neutrophils (%) (Auto) 70 % (31-73)   


 


Lymphocytes (%) (Auto) 19 % (24-48)   


 


Monocytes (%) (Auto) 6 % (0-9)   


 


Eosinophils (%) (Auto) 4 % (0-3)   


 


Basophils (%) (Auto) 1 % (0-3)   


 


Neutrophils # (Auto)


 5.9 x10^3uL


(1.8-7.7) 


 





 


Lymphocytes # (Auto)


 1.6 x10^3/uL


(1.0-4.8) 


 





 


Monocytes # (Auto)


 0.5 x10^3/uL


(0.0-1.1) 


 





 


Eosinophils # (Auto)


 0.4 x10^3/uL


(0.0-0.7) 


 





 


Basophils # (Auto)


 0.1 x10^3/uL


(0.0-0.2) 


 





 


Prothrombin Time


 11.9 SEC


(11.7-14.0) 


 





 


Prothromb Time International


Ratio 0.9 (0.8-1.1) 


 


 





 


Sodium Level


 138 mmol/L


(136-145) 


 





 


Potassium Level


 3.7 mmol/L


(3.5-5.1) 


 





 


Chloride Level


 101 mmol/L


() 


 





 


Carbon Dioxide Level


 27 mmol/L


(21-32) 


 





 


Anion Gap 10 (6-14)   


 


Blood Urea Nitrogen


 14 mg/dL


(7-20) 


 





 


Creatinine


 1.0 mg/dL


(0.6-1.0) 


 





 


Estimated GFR


(Cockcroft-Gault) 69.9 


 


 





 


BUN/Creatinine Ratio 14 (6-20)   


 


Glucose Level


 92 mg/dL


(70-99) 


 





 


Calcium Level


 9.5 mg/dL


(8.5-10.1) 


 





 


Magnesium Level


 2.0 mg/dL


(1.8-2.4) 


 





 


Total Bilirubin


 0.3 mg/dL


(0.2-1.0) 


 





 


Aspartate Amino Transf


(AST/SGOT) 21 U/L (15-37) 


 


 





 


Alanine Aminotransferase


(ALT/SGPT) 43 U/L (14-59) 


 


 





 


Alkaline Phosphatase


 108 U/L


() 


 





 


Creatine Kinase


 161 U/L


() 


 





 


Creatine Kinase MB (Mass)


 3.3 ng/mL


(0.0-3.6) 


 





 


Creatine Kinase MB Relative


Index 2.0 % (0-4) 


 


 





 


Troponin I Quantitative


 0.135 ng/mL


(0.000-0.055) 0.145 ng/mL


(0.000-0.055) 0.163 ng/mL


(0.000-0.055)


 


NT-Pro-B-Type Natriuretic


Peptide 494 pg/mL


(0-124) 


 





 


Total Protein


 7.4 g/dL


(6.4-8.2) 


 





 


Albumin


 3.5 g/dL


(3.4-5.0) 


 





 


Albumin/Globulin Ratio 0.9 (1.0-1.7)   











IMAGES


IMAGES





<Conclusion>


1. No significant right renal artery stenosis.


2. Probable 50% stenosis involving the mid to distal left renal artery although 

images are limited.





DATE:     04/11/19 0900





ECHOCARDIOGRAM


ECHOCARDIOGRAM





<Conclusion>


The Ejection Fraction is >55%. The left ventricular systolic function is normal 

and the ejection fraction is within normal range.


There is normal LV segmental wall motion.


There is moderate concentric left ventricular hypertrophy.


Doppler and Color Flow revealed trace tricuspid regurgitation with an estimated 

PAP of 43 mmHg.


The IVC is dilated and collapses <50% with inspiration.





DATE:     04/09/19 1610





STRESS TEST


STRESS TEST





Conclusion


1. No significant EKG changes with vasodilator testing.


2. Normal perfusion at stress/rest.


3. No significant TID but at upper limits of normal at 1.24.


4. Normal EF at > 60%.


5. Low risk study.


6. Nuclear images suggestive of mild dilated cardiomyopathy.





DATE:     04/19/19 1051





ASSESSMENT/PLAN


ASSESSMENT/PLAN


1. Accelerated HTN: remains labile. Has only bee taking hydralazine daily (was 

confused by it)


2. Acute on chronic diastolic CHF: Appears compensated


3. Elevated troponin: remains elevated at 0.1.and  0.2 wks ago with prior 

admission.  EKG SR without acute changes


4. Chest pain: mixed features


5. Tobaccoism


6. HLP


7. Obesity


8. Hx of Asymptomatic sinus bradycardia: no pauses. 


9. Anxiety; may need xanax. 





Recommendations


1. No AV robbie blocking agents. Restart prior meds discharge with such as 

hydralazine/imdur/norvasc/norvasc/losartan. Hydralazine IV PRN


2. Lipitor and ASA. Continue chlorthalidone, increase imdur


3. Smoking cessation


4. Reinforced lifestyle modification. Wt loss, minimizing processed food, 

exercise, DASH diet, decreasing soda. Stop routine NSAID and only do PRN


5. outpt WANDA workup and outpt ophthalmology routine appt. 


6. Protonix. 


7. Recent MPI noted no reversible defect, may need LHC with persistent trop 

elevation and chest pain.  Will discuss with primary cardiologist.





CODY JUAREZ MD 4/25/19 2112:


CARDIAC CONSULT


ASSESSMENT/PLAN


ASSESSMENT/PLAN


Patient seen and examined.  Agree with NP's assessment and plan.


Accelerated hypertension prob from non compliance.  Resume home ant

ihypertensives and titrate for better control


CP with atypical features.  Recent echo showed normal LV function


Due to recurrent admissions for CP and slightly elevated trop level, we will 

proceed with cardiac cath for definitive evaluation


Acute on chronic diastolic HF better compensated


Thank you for your consultation











RUIZ ARROYO APRN          Apr 25, 2019 13:19


CODY JUAREZ MD           Apr 25, 2019 21:12

## 2019-04-25 NOTE — PDOC
PROGRESS NOTES


Chief Complaint


Chief Complaint


chest pain, SOB





History of Present Illness


History of Present Illness


Patient resting comfortably with sister at bedside.  She continues to have some 

mild chest pain, but states that it is better than it was.  Her BLE swelling is 

also improved this morning.





Tele: NSR 60s





Vitals


Vitals





Vital Signs








  Date Time  Temp Pulse Resp B/P (MAP) Pulse Ox O2 Delivery O2 Flow Rate FiO2


 


4/25/19 07:41      Room Air  


 


4/25/19 07:00 98.2 66 18 174/70 (104) 100   





 98.2       











Physical Exam


General:  Alert, Oriented X3, No acute distress


Heart:  Regular rate, Normal S1, Normal S2, No murmurs


Lungs:  Clear


Abdomen:  Soft, No tenderness


Extremities:  No clubbing, No cyanosis, Other (BLE edema)


Skin:  No rashes, No breakdown, No significant lesion





Labs


LABS





Laboratory Tests








Test


 4/24/19


19:45 4/25/19


01:00 4/25/19


07:00


 


White Blood Count


 8.4 x10^3/uL


(4.0-11.0) 


 





 


Red Blood Count


 5.05 x10^6/uL


(3.50-5.40) 


 





 


Hemoglobin


 15.5 g/dL


(12.0-15.5) 


 





 


Hematocrit


 46.7 %


(36.0-47.0) 


 





 


Mean Corpuscular Volume 92 fL ()   


 


Mean Corpuscular Hemoglobin 31 pg (25-35)   


 


Mean Corpuscular Hemoglobin


Concent 33 g/dL


(31-37) 


 





 


Red Cell Distribution Width


 14.1 %


(11.5-14.5) 


 





 


Platelet Count


 229 x10^3/uL


(140-400) 


 





 


Neutrophils (%) (Auto) 70 % (31-73)   


 


Lymphocytes (%) (Auto) 19 % (24-48)   


 


Monocytes (%) (Auto) 6 % (0-9)   


 


Eosinophils (%) (Auto) 4 % (0-3)   


 


Basophils (%) (Auto) 1 % (0-3)   


 


Neutrophils # (Auto)


 5.9 x10^3uL


(1.8-7.7) 


 





 


Lymphocytes # (Auto)


 1.6 x10^3/uL


(1.0-4.8) 


 





 


Monocytes # (Auto)


 0.5 x10^3/uL


(0.0-1.1) 


 





 


Eosinophils # (Auto)


 0.4 x10^3/uL


(0.0-0.7) 


 





 


Basophils # (Auto)


 0.1 x10^3/uL


(0.0-0.2) 


 





 


Prothrombin Time


 11.9 SEC


(11.7-14.0) 


 





 


Prothromb Time International


Ratio 0.9 (0.8-1.1) 


 


 





 


Sodium Level


 138 mmol/L


(136-145) 


 





 


Potassium Level


 3.7 mmol/L


(3.5-5.1) 


 





 


Chloride Level


 101 mmol/L


() 


 





 


Carbon Dioxide Level


 27 mmol/L


(21-32) 


 





 


Anion Gap 10 (6-14)   


 


Blood Urea Nitrogen


 14 mg/dL


(7-20) 


 





 


Creatinine


 1.0 mg/dL


(0.6-1.0) 


 





 


Estimated GFR


(Cockcroft-Gault) 69.9 


 


 





 


BUN/Creatinine Ratio 14 (6-20)   


 


Glucose Level


 92 mg/dL


(70-99) 


 





 


Calcium Level


 9.5 mg/dL


(8.5-10.1) 


 





 


Magnesium Level


 2.0 mg/dL


(1.8-2.4) 


 





 


Total Bilirubin


 0.3 mg/dL


(0.2-1.0) 


 





 


Aspartate Amino Transf


(AST/SGOT) 21 U/L (15-37) 


 


 





 


Alanine Aminotransferase


(ALT/SGPT) 43 U/L (14-59) 


 


 





 


Alkaline Phosphatase


 108 U/L


() 


 





 


Creatine Kinase


 161 U/L


() 


 





 


Creatine Kinase MB (Mass)


 3.3 ng/mL


(0.0-3.6) 


 





 


Creatine Kinase MB Relative


Index 2.0 % (0-4) 


 


 





 


Troponin I Quantitative


 0.135 ng/mL


(0.000-0.055) 0.145 ng/mL


(0.000-0.055) 0.163 ng/mL


(0.000-0.055)


 


NT-Pro-B-Type Natriuretic


Peptide 494 pg/mL


(0-124) 


 





 


Total Protein


 7.4 g/dL


(6.4-8.2) 


 





 


Albumin


 3.5 g/dL


(3.4-5.0) 


 





 


Albumin/Globulin Ratio 0.9 (1.0-1.7)   











Review of Systems


Review of Systems


chest pain improving, BLE swelling improving





Assessment and Plan


Assessmemt and Plan


Problems


Medical Problems:


(1) Elevated troponin


Status: Acute  





(2) Hypertension


Status: Acute  





Assessment:


Malignant Hypertension


Elevated troponin


BLE edema, improving





Plan:


Cardiology consulted


Lasix 40 mg PO added, BLE edema improving overnight


Encourage elevation of legs/ambulation


Discussed need for medication adherence and sodium restriction


Home medications


Daily labs


PT/OT


Dispo: await Cardiology input








Comment


Review of Relevant


I have reviewed the following items fide (where applicable) has been applied.


Labs





Laboratory Tests








Test


 4/24/19


19:45 4/25/19


01:00 4/25/19


07:00


 


White Blood Count


 8.4 x10^3/uL


(4.0-11.0) 


 





 


Red Blood Count


 5.05 x10^6/uL


(3.50-5.40) 


 





 


Hemoglobin


 15.5 g/dL


(12.0-15.5) 


 





 


Hematocrit


 46.7 %


(36.0-47.0) 


 





 


Mean Corpuscular Volume 92 fL ()   


 


Mean Corpuscular Hemoglobin 31 pg (25-35)   


 


Mean Corpuscular Hemoglobin


Concent 33 g/dL


(31-37) 


 





 


Red Cell Distribution Width


 14.1 %


(11.5-14.5) 


 





 


Platelet Count


 229 x10^3/uL


(140-400) 


 





 


Neutrophils (%) (Auto) 70 % (31-73)   


 


Lymphocytes (%) (Auto) 19 % (24-48)   


 


Monocytes (%) (Auto) 6 % (0-9)   


 


Eosinophils (%) (Auto) 4 % (0-3)   


 


Basophils (%) (Auto) 1 % (0-3)   


 


Neutrophils # (Auto)


 5.9 x10^3uL


(1.8-7.7) 


 





 


Lymphocytes # (Auto)


 1.6 x10^3/uL


(1.0-4.8) 


 





 


Monocytes # (Auto)


 0.5 x10^3/uL


(0.0-1.1) 


 





 


Eosinophils # (Auto)


 0.4 x10^3/uL


(0.0-0.7) 


 





 


Basophils # (Auto)


 0.1 x10^3/uL


(0.0-0.2) 


 





 


Prothrombin Time


 11.9 SEC


(11.7-14.0) 


 





 


Prothromb Time International


Ratio 0.9 (0.8-1.1) 


 


 





 


Sodium Level


 138 mmol/L


(136-145) 


 





 


Potassium Level


 3.7 mmol/L


(3.5-5.1) 


 





 


Chloride Level


 101 mmol/L


() 


 





 


Carbon Dioxide Level


 27 mmol/L


(21-32) 


 





 


Anion Gap 10 (6-14)   


 


Blood Urea Nitrogen


 14 mg/dL


(7-20) 


 





 


Creatinine


 1.0 mg/dL


(0.6-1.0) 


 





 


Estimated GFR


(Cockcroft-Gault) 69.9 


 


 





 


BUN/Creatinine Ratio 14 (6-20)   


 


Glucose Level


 92 mg/dL


(70-99) 


 





 


Calcium Level


 9.5 mg/dL


(8.5-10.1) 


 





 


Magnesium Level


 2.0 mg/dL


(1.8-2.4) 


 





 


Total Bilirubin


 0.3 mg/dL


(0.2-1.0) 


 





 


Aspartate Amino Transf


(AST/SGOT) 21 U/L (15-37) 


 


 





 


Alanine Aminotransferase


(ALT/SGPT) 43 U/L (14-59) 


 


 





 


Alkaline Phosphatase


 108 U/L


() 


 





 


Creatine Kinase


 161 U/L


() 


 





 


Creatine Kinase MB (Mass)


 3.3 ng/mL


(0.0-3.6) 


 





 


Creatine Kinase MB Relative


Index 2.0 % (0-4) 


 


 





 


Troponin I Quantitative


 0.135 ng/mL


(0.000-0.055) 0.145 ng/mL


(0.000-0.055) 0.163 ng/mL


(0.000-0.055)


 


NT-Pro-B-Type Natriuretic


Peptide 494 pg/mL


(0-124) 


 





 


Total Protein


 7.4 g/dL


(6.4-8.2) 


 





 


Albumin


 3.5 g/dL


(3.4-5.0) 


 





 


Albumin/Globulin Ratio 0.9 (1.0-1.7)   








Laboratory Tests








Test


 4/24/19


19:45 4/25/19


01:00 4/25/19


07:00


 


White Blood Count


 8.4 x10^3/uL


(4.0-11.0) 


 





 


Red Blood Count


 5.05 x10^6/uL


(3.50-5.40) 


 





 


Hemoglobin


 15.5 g/dL


(12.0-15.5) 


 





 


Hematocrit


 46.7 %


(36.0-47.0) 


 





 


Mean Corpuscular Volume 92 fL ()   


 


Mean Corpuscular Hemoglobin 31 pg (25-35)   


 


Mean Corpuscular Hemoglobin


Concent 33 g/dL


(31-37) 


 





 


Red Cell Distribution Width


 14.1 %


(11.5-14.5) 


 





 


Platelet Count


 229 x10^3/uL


(140-400) 


 





 


Neutrophils (%) (Auto) 70 % (31-73)   


 


Lymphocytes (%) (Auto) 19 % (24-48)   


 


Monocytes (%) (Auto) 6 % (0-9)   


 


Eosinophils (%) (Auto) 4 % (0-3)   


 


Basophils (%) (Auto) 1 % (0-3)   


 


Neutrophils # (Auto)


 5.9 x10^3uL


(1.8-7.7) 


 





 


Lymphocytes # (Auto)


 1.6 x10^3/uL


(1.0-4.8) 


 





 


Monocytes # (Auto)


 0.5 x10^3/uL


(0.0-1.1) 


 





 


Eosinophils # (Auto)


 0.4 x10^3/uL


(0.0-0.7) 


 





 


Basophils # (Auto)


 0.1 x10^3/uL


(0.0-0.2) 


 





 


Prothrombin Time


 11.9 SEC


(11.7-14.0) 


 





 


Prothromb Time International


Ratio 0.9 (0.8-1.1) 


 


 





 


Sodium Level


 138 mmol/L


(136-145) 


 





 


Potassium Level


 3.7 mmol/L


(3.5-5.1) 


 





 


Chloride Level


 101 mmol/L


() 


 





 


Carbon Dioxide Level


 27 mmol/L


(21-32) 


 





 


Anion Gap 10 (6-14)   


 


Blood Urea Nitrogen


 14 mg/dL


(7-20) 


 





 


Creatinine


 1.0 mg/dL


(0.6-1.0) 


 





 


Estimated GFR


(Cockcroft-Gault) 69.9 


 


 





 


BUN/Creatinine Ratio 14 (6-20)   


 


Glucose Level


 92 mg/dL


(70-99) 


 





 


Calcium Level


 9.5 mg/dL


(8.5-10.1) 


 





 


Magnesium Level


 2.0 mg/dL


(1.8-2.4) 


 





 


Total Bilirubin


 0.3 mg/dL


(0.2-1.0) 


 





 


Aspartate Amino Transf


(AST/SGOT) 21 U/L (15-37) 


 


 





 


Alanine Aminotransferase


(ALT/SGPT) 43 U/L (14-59) 


 


 





 


Alkaline Phosphatase


 108 U/L


() 


 





 


Creatine Kinase


 161 U/L


() 


 





 


Creatine Kinase MB (Mass)


 3.3 ng/mL


(0.0-3.6) 


 





 


Creatine Kinase MB Relative


Index 2.0 % (0-4) 


 


 





 


Troponin I Quantitative


 0.135 ng/mL


(0.000-0.055) 0.145 ng/mL


(0.000-0.055) 0.163 ng/mL


(0.000-0.055)


 


NT-Pro-B-Type Natriuretic


Peptide 494 pg/mL


(0-124) 


 





 


Total Protein


 7.4 g/dL


(6.4-8.2) 


 





 


Albumin


 3.5 g/dL


(3.4-5.0) 


 





 


Albumin/Globulin Ratio 0.9 (1.0-1.7)   








Medications





Current Medications


Aspirin (Children'S Aspirin) 324 mg 1X  ONCE PO  Last administered on 4/24/19at 

19:24;  Start 4/24/19 at 19:15;  Stop 4/24/19 at 19:16;  Status DC


Nitroglycerin (Nitrostat) 0.4 mg PRN Q5MIN  PRN SL CHEST PAIN Last administered 

on 4/24/19at 20:28;  Start 4/24/19 at 19:30


Iohexol (Omnipaque 350 Mg/ml) 100 ml 1X  ONCE IV  Last administered on 4/24/19at

21:03;  Start 4/24/19 at 20:45;  Stop 4/24/19 at 20:46;  Status DC


Hydralazine HCl (Apresoline Inj) 10 mg 1X  ONCE IVP  Last administered on 

4/24/19at 22:55;  Start 4/24/19 at 22:30;  Stop 4/24/19 at 22:31;  Status DC


Furosemide (Lasix) 40 mg DAILY PO ;  Start 4/25/19 at 09:00


Amlodipine Besylate (Norvasc) 10 mg DAILY PO ;  Start 4/25/19 at 09:00


Aspirin (Ecotrin) 325 mg DAILYWBKFT PO ;  Start 4/25/19 at 08:00


Atorvastatin Calcium (Lipitor) 10 mg QHS PO ;  Start 4/25/19 at 21:00


Acetaminophen/ Hydrocodone Bitart (Lortab 7.5/325) 1 tab PRN Q6HRS  PRN PO 

SEVERE PAIN;  Start 4/24/19 at 22:15


Isosorbide Mononitrate (Imdur) 30 mg DAILY PO ;  Start 4/25/19 at 09:00


Losartan Potassium (Cozaar) 50 mg DAILY PO ;  Start 4/25/19 at 09:00


Hydralazine HCl (Apresoline) 50 mg TID PO ;  Start 4/25/19 at 09:00


Chlorthalidone (Thalitone) 25 mg DAILY PO ;  Start 4/25/19 at 09:00


Labetalol HCl (Normodyne Iv Push) 5 mg 1X  ONCE IVP  Last administered on 

4/24/19at 23:51;  Start 4/25/19 at 00:00;  Stop 4/25/19 at 00:01;  Status DC





Active Scripts


Active


Aspirin Ec (Aspirin) 325 Mg Tablet.dr 325 Mg PO DAILYWBKFT 30 Days


Amlodipine Besylate 10 Mg Tablet 10 Mg PO DAILY 30 Days


Isosorbide Mononitrate Er (Isosorbide Mononitrate) 30 Mg Tab.er.24h 30 Mg PO 

DAILY 30 Days


Hydralazine Hcl 50 Mg Tablet 50 Mg PO TID 30 Days


Atorvastatin Calcium 10 Mg Tablet 10 Mg PO QHS 30 Days


Reported


Hydrocodone-Apap 7.5-325  ** (Hydrocodone Bit/Acetaminophen) 1 Tab Tablet 1 Tab 

PO PRN Q6HRS PRN


Edarbyclor 40-25 Mg Tablet (Azilsartan/Chlorthalidone) 1 Each Tablet 1 Each PO 

DAILY


Vitals/I & O





Vital Sign - Last 24 Hours








 4/24/19 4/24/19 4/24/19 4/24/19





 18:52 19:30 19:59 20:09


 


Temp 99.3   





 99.3   


 


Pulse 72 62 64 60


 


Resp 24   


 


B/P (MAP) 224/86 (132) 191/73 (112) 178/76 (110) 178/76


 


Pulse Ox 98   


 


O2 Delivery Room Air Room Air Room Air 


 


    





    





 4/24/19 4/24/19 4/24/19 4/24/19





 20:17 20:28 20:29 21:39


 


Pulse 63 62 58 64


 


B/P (MAP) 186/75 168/69 168/71 (103) 208/79 (122)


 


O2 Delivery   Room Air Room Air





 4/24/19 4/24/19 4/24/19 4/25/19





 22:09 22:55 23:51 00:20


 


Pulse 56 58 58 


 


B/P (MAP) 208/78 (121) 191/74 205/77 


 


O2 Delivery Room Air   Room Air





 4/25/19 4/25/19 4/25/19 4/25/19





 00:32 03:03 07:00 07:41


 


Temp 98.2  98.2 





 98.2  98.2 


 


Pulse 66 63 66 


 


Resp 18 18 18 


 


B/P (MAP) 187/76 (113) 172/67 (102) 174/70 (104) 


 


Pulse Ox 95 93 100 


 


O2 Delivery Room Air Room Air Room Air Room Air














Intake and Output   


 


 4/24/19 4/24/19 4/25/19





 15:00 23:00 07:00


 


Intake Total   0 ml


 


Output Total   425 ml


 


Balance   -425 ml

















RIMMA SIMMONS III DO           Apr 25, 2019 09:36

## 2019-04-26 VITALS
SYSTOLIC BLOOD PRESSURE: 129 MMHG | DIASTOLIC BLOOD PRESSURE: 62 MMHG | SYSTOLIC BLOOD PRESSURE: 129 MMHG | DIASTOLIC BLOOD PRESSURE: 62 MMHG

## 2019-04-26 VITALS — SYSTOLIC BLOOD PRESSURE: 164 MMHG | DIASTOLIC BLOOD PRESSURE: 75 MMHG

## 2019-04-26 VITALS — DIASTOLIC BLOOD PRESSURE: 64 MMHG | SYSTOLIC BLOOD PRESSURE: 147 MMHG

## 2019-04-26 VITALS — SYSTOLIC BLOOD PRESSURE: 153 MMHG | DIASTOLIC BLOOD PRESSURE: 65 MMHG

## 2019-04-26 VITALS — DIASTOLIC BLOOD PRESSURE: 56 MMHG | SYSTOLIC BLOOD PRESSURE: 115 MMHG

## 2019-04-26 VITALS — SYSTOLIC BLOOD PRESSURE: 155 MMHG | DIASTOLIC BLOOD PRESSURE: 72 MMHG

## 2019-04-26 VITALS — DIASTOLIC BLOOD PRESSURE: 78 MMHG | SYSTOLIC BLOOD PRESSURE: 169 MMHG

## 2019-04-26 VITALS — SYSTOLIC BLOOD PRESSURE: 169 MMHG | DIASTOLIC BLOOD PRESSURE: 78 MMHG

## 2019-04-26 VITALS — DIASTOLIC BLOOD PRESSURE: 74 MMHG | SYSTOLIC BLOOD PRESSURE: 168 MMHG

## 2019-04-26 VITALS — SYSTOLIC BLOOD PRESSURE: 176 MMHG | DIASTOLIC BLOOD PRESSURE: 80 MMHG

## 2019-04-26 VITALS — SYSTOLIC BLOOD PRESSURE: 125 MMHG | DIASTOLIC BLOOD PRESSURE: 60 MMHG

## 2019-04-26 LAB
ANION GAP SERPL CALC-SCNC: 11 MMOL/L (ref 6–14)
BASOPHILS # BLD AUTO: 0.1 X10^3/UL (ref 0–0.2)
BASOPHILS NFR BLD: 1 % (ref 0–3)
BUN SERPL-MCNC: 14 MG/DL (ref 7–20)
CALCIUM SERPL-MCNC: 9.3 MG/DL (ref 8.5–10.1)
CHLORIDE SERPL-SCNC: 101 MMOL/L (ref 98–107)
CO2 SERPL-SCNC: 27 MMOL/L (ref 21–32)
CREAT SERPL-MCNC: 1.1 MG/DL (ref 0.6–1)
EOSINOPHIL NFR BLD: 0.3 X10^3/UL (ref 0–0.7)
EOSINOPHIL NFR BLD: 6 % (ref 0–3)
ERYTHROCYTE [DISTWIDTH] IN BLOOD BY AUTOMATED COUNT: 13.9 % (ref 11.5–14.5)
GFR SERPLBLD BASED ON 1.73 SQ M-ARVRAT: 62.6 ML/MIN
GLUCOSE SERPL-MCNC: 109 MG/DL (ref 70–99)
HCT VFR BLD CALC: 42.6 % (ref 36–47)
HGB BLD-MCNC: 14.5 G/DL (ref 12–15.5)
LYMPHOCYTES # BLD: 1.1 X10^3/UL (ref 1–4.8)
LYMPHOCYTES NFR BLD AUTO: 20 % (ref 24–48)
MCH RBC QN AUTO: 31 PG (ref 25–35)
MCHC RBC AUTO-ENTMCNC: 34 G/DL (ref 31–37)
MCV RBC AUTO: 92 FL (ref 79–100)
MONO #: 0.8 X10^3/UL (ref 0–1.1)
MONOCYTES NFR BLD: 13 % (ref 0–9)
NEUT #: 3.6 X10^3UL (ref 1.8–7.7)
NEUTROPHILS NFR BLD AUTO: 61 % (ref 31–73)
PLATELET # BLD AUTO: 217 X10^3/UL (ref 140–400)
POTASSIUM SERPL-SCNC: 3.9 MMOL/L (ref 3.5–5.1)
RBC # BLD AUTO: 4.63 X10^6/UL (ref 3.5–5.4)
SODIUM SERPL-SCNC: 139 MMOL/L (ref 136–145)
WBC # BLD AUTO: 5.9 X10^3/UL (ref 4–11)

## 2019-04-26 PROCEDURE — B3101ZZ FLUOROSCOPY OF THORACIC AORTA USING LOW OSMOLAR CONTRAST: ICD-10-PCS | Performed by: INTERNAL MEDICINE

## 2019-04-26 PROCEDURE — B2151ZZ FLUOROSCOPY OF LEFT HEART USING LOW OSMOLAR CONTRAST: ICD-10-PCS | Performed by: INTERNAL MEDICINE

## 2019-04-26 PROCEDURE — B2111ZZ FLUOROSCOPY OF MULTIPLE CORONARY ARTERIES USING LOW OSMOLAR CONTRAST: ICD-10-PCS | Performed by: INTERNAL MEDICINE

## 2019-04-26 PROCEDURE — 4A023N7 MEASUREMENT OF CARDIAC SAMPLING AND PRESSURE, LEFT HEART, PERCUTANEOUS APPROACH: ICD-10-PCS | Performed by: INTERNAL MEDICINE

## 2019-04-26 RX ADMIN — FUROSEMIDE SCH MG: 40 TABLET ORAL at 13:41

## 2019-04-26 RX ADMIN — CHLORTHALIDONE SCH MG: 25 TABLET ORAL at 10:40

## 2019-04-26 RX ADMIN — ASPIRIN SCH MG: 325 TABLET, DELAYED RELEASE ORAL at 08:40

## 2019-04-26 RX ADMIN — LOSARTAN POTASSIUM SCH MG: 50 TABLET ORAL at 10:40

## 2019-04-26 NOTE — NUR
Discharge Note:



DARA MENA [address]



Discharge instructions and discharge home medications reviewed with Patient and Family 
Member and a copy given. All questions have been answered and understanding verbalized. 



The following instructions and handouts were given: CP, HTN, post cardiac cath, edema, 
cardiac rehab



Discontinued lines and drains: Peripheral IV intact x2



Patient discharged to Home or Self Care with Family Member via Wheelchair

## 2019-04-26 NOTE — PDOC
PROGRESS NOTES


Chief Complaint


Chief Complaint


1. Accelerated HTN: 


2. Acute on chronic diastolic CHF: Appears compensated


3. Elevated troponin: remains elevated at 0.1.and  0.2 wks ago with prior 

admission.  EKG SR without acute changes


4. Chest pain: mixed features


5. Tobaccoism


6. HLP


7. Obesity


8. Hx of Asymptomatic sinus bradycardia: no pauses. 


9. Anxiety;





History of Present Illness


History of Present Illness


For [facility] later because of persistent chest pain and  hypertension


Blood pressure 170s, asymptomatic


Some element of anxiety per cardiology note


Troponin peaks 0.16 heart rate on the low side





Plan:  trial of Xanax


[facility] later


IV labetolol when necessary


Cardiology will possibly increase some BP meds





Vitals


Vitals





Vital Signs








  Date Time  Temp Pulse Resp B/P (MAP) Pulse Ox O2 Delivery O2 Flow Rate FiO2


 


4/26/19 08:48  57  172/78    


 


4/26/19 07:00 97.9  18  94 Room Air  





 97.9       











Physical Exam


General:  Alert, Oriented X3, Cooperative, No acute distress


Heart:  Regular rate (SR), Normal S1, Normal S2, Other (2/6 systolic murmur to 

LLS border)


Lungs:  Clear


Abdomen:  Soft, No tenderness


Extremities:  No cyanosis, Other (1+ bilateral LE pitting edema)


Skin:  No breakdown, No significant lesion





Labs


LABS





Laboratory Tests








Test


 4/26/19


05:15


 


White Blood Count


 5.9 x10^3/uL


(4.0-11.0)


 


Red Blood Count


 4.63 x10^6/uL


(3.50-5.40)


 


Hemoglobin


 14.5 g/dL


(12.0-15.5)


 


Hematocrit


 42.6 %


(36.0-47.0)


 


Mean Corpuscular Volume 92 fL () 


 


Mean Corpuscular Hemoglobin 31 pg (25-35) 


 


Mean Corpuscular Hemoglobin


Concent 34 g/dL


(31-37)


 


Red Cell Distribution Width


 13.9 %


(11.5-14.5)


 


Platelet Count


 217 x10^3/uL


(140-400)


 


Neutrophils (%) (Auto) 61 % (31-73) 


 


Lymphocytes (%) (Auto) 20 % (24-48) 


 


Monocytes (%) (Auto) 13 % (0-9) 


 


Eosinophils (%) (Auto) 6 % (0-3) 


 


Basophils (%) (Auto) 1 % (0-3) 


 


Neutrophils # (Auto)


 3.6 x10^3uL


(1.8-7.7)


 


Lymphocytes # (Auto)


 1.1 x10^3/uL


(1.0-4.8)


 


Monocytes # (Auto)


 0.8 x10^3/uL


(0.0-1.1)


 


Eosinophils # (Auto)


 0.3 x10^3/uL


(0.0-0.7)


 


Basophils # (Auto)


 0.1 x10^3/uL


(0.0-0.2)


 


Sodium Level


 139 mmol/L


(136-145)


 


Potassium Level


 3.9 mmol/L


(3.5-5.1)


 


Chloride Level


 101 mmol/L


()


 


Carbon Dioxide Level


 27 mmol/L


(21-32)


 


Anion Gap 11 (6-14) 


 


Blood Urea Nitrogen


 14 mg/dL


(7-20)


 


Creatinine


 1.1 mg/dL


(0.6-1.0)


 


Estimated GFR


(Cockcroft-Gault) 62.6 





 


Glucose Level


 109 mg/dL


(70-99)


 


Calcium Level


 9.3 mg/dL


(8.5-10.1)











Review of Systems


Review of Systems


A 14 point ROS was completed with the following noted as positive:





Other systems reviewed and negative.


\CONSTITUTIONAL:        No fever or chills


EYES:                          No recent changes


SKIN:               No rash or itching


CARDIOVASCULAR:     No chest pain, syncope, palpitations, or edema


RESPIRATORY:            No SOB or cough


GASTROINTESTINAL:    No nausea, vomiting or abdominal pain


NEUROLOGICAL:          No headaches or weakness


ENDOCRINE:               No cold or heat intolerance


GENITOURINARY:         No urgency or frequency of urination


MUSCULOSKELETAL:   No back pain or joint pain


LYMPHATICS:               No enlarged lymph nodes


PSYCHIATRIC:              No anxiety or depression





Assessment and Plan


Assessmemt and Plan


Problems


Medical Problems:


(1) Elevated troponin


Status: Acute  





(2) Hypertension


Status: Acute  











Comment


Review of Relevant


I have reviewed the following items fide (where applicable) has been applied.


Labs





Laboratory Tests








Test


 4/24/19


19:45 4/25/19


01:00 4/25/19


07:00 4/26/19


05:15


 


White Blood Count


 8.4 x10^3/uL


(4.0-11.0) 


 


 5.9 x10^3/uL


(4.0-11.0)


 


Red Blood Count


 5.05 x10^6/uL


(3.50-5.40) 


 


 4.63 x10^6/uL


(3.50-5.40)


 


Hemoglobin


 15.5 g/dL


(12.0-15.5) 


 


 14.5 g/dL


(12.0-15.5)


 


Hematocrit


 46.7 %


(36.0-47.0) 


 


 42.6 %


(36.0-47.0)


 


Mean Corpuscular Volume 92 fL ()    92 fL () 


 


Mean Corpuscular Hemoglobin 31 pg (25-35)    31 pg (25-35) 


 


Mean Corpuscular Hemoglobin


Concent 33 g/dL


(31-37) 


 


 34 g/dL


(31-37)


 


Red Cell Distribution Width


 14.1 %


(11.5-14.5) 


 


 13.9 %


(11.5-14.5)


 


Platelet Count


 229 x10^3/uL


(140-400) 


 


 217 x10^3/uL


(140-400)


 


Neutrophils (%) (Auto) 70 % (31-73)    61 % (31-73) 


 


Lymphocytes (%) (Auto) 19 % (24-48)    20 % (24-48) 


 


Monocytes (%) (Auto) 6 % (0-9)    13 % (0-9) 


 


Eosinophils (%) (Auto) 4 % (0-3)    6 % (0-3) 


 


Basophils (%) (Auto) 1 % (0-3)    1 % (0-3) 


 


Neutrophils # (Auto)


 5.9 x10^3uL


(1.8-7.7) 


 


 3.6 x10^3uL


(1.8-7.7)


 


Lymphocytes # (Auto)


 1.6 x10^3/uL


(1.0-4.8) 


 


 1.1 x10^3/uL


(1.0-4.8)


 


Monocytes # (Auto)


 0.5 x10^3/uL


(0.0-1.1) 


 


 0.8 x10^3/uL


(0.0-1.1)


 


Eosinophils # (Auto)


 0.4 x10^3/uL


(0.0-0.7) 


 


 0.3 x10^3/uL


(0.0-0.7)


 


Basophils # (Auto)


 0.1 x10^3/uL


(0.0-0.2) 


 


 0.1 x10^3/uL


(0.0-0.2)


 


Prothrombin Time


 11.9 SEC


(11.7-14.0) 


 


 





 


Prothromb Time International


Ratio 0.9 (0.8-1.1) 


 


 


 





 


Sodium Level


 138 mmol/L


(136-145) 


 


 139 mmol/L


(136-145)


 


Potassium Level


 3.7 mmol/L


(3.5-5.1) 


 


 3.9 mmol/L


(3.5-5.1)


 


Chloride Level


 101 mmol/L


() 


 


 101 mmol/L


()


 


Carbon Dioxide Level


 27 mmol/L


(21-32) 


 


 27 mmol/L


(21-32)


 


Anion Gap 10 (6-14)    11 (6-14) 


 


Blood Urea Nitrogen


 14 mg/dL


(7-20) 


 


 14 mg/dL


(7-20)


 


Creatinine


 1.0 mg/dL


(0.6-1.0) 


 


 1.1 mg/dL


(0.6-1.0)


 


Estimated GFR


(Cockcroft-Gault) 69.9 


 


 


 62.6 





 


BUN/Creatinine Ratio 14 (6-20)    


 


Glucose Level


 92 mg/dL


(70-99) 


 


 109 mg/dL


(70-99)


 


Calcium Level


 9.5 mg/dL


(8.5-10.1) 


 


 9.3 mg/dL


(8.5-10.1)


 


Magnesium Level


 2.0 mg/dL


(1.8-2.4) 


 


 





 


Total Bilirubin


 0.3 mg/dL


(0.2-1.0) 


 


 





 


Aspartate Amino Transf


(AST/SGOT) 21 U/L (15-37) 


 


 


 





 


Alanine Aminotransferase


(ALT/SGPT) 43 U/L (14-59) 


 


 


 





 


Alkaline Phosphatase


 108 U/L


() 


 


 





 


Creatine Kinase


 161 U/L


() 


 


 





 


Creatine Kinase MB (Mass)


 3.3 ng/mL


(0.0-3.6) 


 


 





 


Creatine Kinase MB Relative


Index 2.0 % (0-4) 


 


 


 





 


Troponin I Quantitative


 0.135 ng/mL


(0.000-0.055) 0.145 ng/mL


(0.000-0.055) 0.163 ng/mL


(0.000-0.055) 





 


NT-Pro-B-Type Natriuretic


Peptide 494 pg/mL


(0-124) 


 


 





 


Total Protein


 7.4 g/dL


(6.4-8.2) 


 


 





 


Albumin


 3.5 g/dL


(3.4-5.0) 


 


 





 


Albumin/Globulin Ratio 0.9 (1.0-1.7)    








Laboratory Tests








Test


 4/26/19


05:15


 


White Blood Count


 5.9 x10^3/uL


(4.0-11.0)


 


Red Blood Count


 4.63 x10^6/uL


(3.50-5.40)


 


Hemoglobin


 14.5 g/dL


(12.0-15.5)


 


Hematocrit


 42.6 %


(36.0-47.0)


 


Mean Corpuscular Volume 92 fL () 


 


Mean Corpuscular Hemoglobin 31 pg (25-35) 


 


Mean Corpuscular Hemoglobin


Concent 34 g/dL


(31-37)


 


Red Cell Distribution Width


 13.9 %


(11.5-14.5)


 


Platelet Count


 217 x10^3/uL


(140-400)


 


Neutrophils (%) (Auto) 61 % (31-73) 


 


Lymphocytes (%) (Auto) 20 % (24-48) 


 


Monocytes (%) (Auto) 13 % (0-9) 


 


Eosinophils (%) (Auto) 6 % (0-3) 


 


Basophils (%) (Auto) 1 % (0-3) 


 


Neutrophils # (Auto)


 3.6 x10^3uL


(1.8-7.7)


 


Lymphocytes # (Auto)


 1.1 x10^3/uL


(1.0-4.8)


 


Monocytes # (Auto)


 0.8 x10^3/uL


(0.0-1.1)


 


Eosinophils # (Auto)


 0.3 x10^3/uL


(0.0-0.7)


 


Basophils # (Auto)


 0.1 x10^3/uL


(0.0-0.2)


 


Sodium Level


 139 mmol/L


(136-145)


 


Potassium Level


 3.9 mmol/L


(3.5-5.1)


 


Chloride Level


 101 mmol/L


()


 


Carbon Dioxide Level


 27 mmol/L


(21-32)


 


Anion Gap 11 (6-14) 


 


Blood Urea Nitrogen


 14 mg/dL


(7-20)


 


Creatinine


 1.1 mg/dL


(0.6-1.0)


 


Estimated GFR


(Cockcroft-Gault) 62.6 





 


Glucose Level


 109 mg/dL


(70-99)


 


Calcium Level


 9.3 mg/dL


(8.5-10.1)








Medications





Current Medications


Aspirin (Children'S Aspirin) 324 mg 1X  ONCE PO  Last administered on 4/24/19at 

19:24;  Start 4/24/19 at 19:15;  Stop 4/24/19 at 19:16;  Status DC


Nitroglycerin (Nitrostat) 0.4 mg PRN Q5MIN  PRN SL CHEST PAIN Last administered 

on 4/24/19at 20:28;  Start 4/24/19 at 19:30


Iohexol (Omnipaque 350 Mg/ml) 100 ml 1X  ONCE IV  Last administered on 4/24/19at

21:03;  Start 4/24/19 at 20:45;  Stop 4/24/19 at 20:46;  Status DC


Hydralazine HCl (Apresoline Inj) 10 mg 1X  ONCE IVP  Last administered on 

4/24/19at 22:55;  Start 4/24/19 at 22:30;  Stop 4/24/19 at 22:31;  Status DC


Furosemide (Lasix) 40 mg DAILY PO  Last administered on 4/25/19at 09:33;  Start 

4/25/19 at 09:00


Amlodipine Besylate (Norvasc) 10 mg DAILY PO  Last administered on 4/25/19at 

09:34;  Start 4/25/19 at 09:00


Aspirin (Ecotrin) 325 mg DAILYWBKFT PO  Last administered on 4/26/19at 08:40;  

Start 4/25/19 at 08:00


Atorvastatin Calcium (Lipitor) 10 mg QHS PO  Last administered on 4/25/19at 

21:31;  Start 4/25/19 at 21:00


Acetaminophen/ Hydrocodone Bitart (Lortab 7.5/325) 1 tab PRN Q6HRS  PRN PO 

SEVERE PAIN;  Start 4/24/19 at 22:15


Isosorbide Mononitrate (Imdur) 30 mg DAILY PO  Last administered on 4/25/19at 

09:33;  Start 4/25/19 at 09:00;  Stop 4/25/19 at 13:21;  Status DC


Losartan Potassium (Cozaar) 50 mg DAILY PO  Last administered on 4/25/19at 

09:34;  Start 4/25/19 at 09:00


Hydralazine HCl (Apresoline) 50 mg TID PO  Last administered on 4/25/19at 21:31;

 Start 4/25/19 at 09:00


Chlorthalidone (Thalitone) 25 mg DAILY PO  Last administered on 4/25/19at 09:34;

 Start 4/25/19 at 09:00


Labetalol HCl (Normodyne Iv Push) 5 mg 1X  ONCE IVP  Last administered on 4/24/1

9at 23:51;  Start 4/25/19 at 00:00;  Stop 4/25/19 at 00:01;  Status DC


Pantoprazole Sodium (Protonix) 40 mg DAILYAC PO ;  Start 4/26/19 at 07:30


Pantoprazole Sodium (Protonix) 40 mg 1X  ONCE PO  Last administered on 4/25/19at

13:53;  Start 4/25/19 at 13:15;  Stop 4/25/19 at 13:16;  Status DC


Isosorbide Mononitrate (Imdur) 60 mg DAILY PO ;  Start 4/26/19 at 09:00


Hydralazine HCl (Apresoline Inj) 10 mg PRN Q4HRS  PRN IVP ELEVATED BP, SEE 

COMMENTS Last administered on 4/26/19at 08:48;  Start 4/25/19 at 13:15


Isosorbide Mononitrate (Imdur) 30 mg 1X  ONCE PO  Last administered on 4/25/19at

13:52;  Start 4/25/19 at 13:15;  Stop 4/25/19 at 13:27;  Status DC


Acetaminophen (Tylenol) 500 mg PRN Q6HRS  PRN PO MILD PAIN / TEMP;  Start 

4/26/19 at 08:30


Acetaminophen/ Codeine Phosphate (Tylenol #3) 1 tab PRN Q6HRS  PRN PO MODERATE 

PAIN;  Start 4/26/19 at 08:30


Ondansetron HCl (Zofran) 4 mg PRN Q6HRS  PRN IV NAUSEA/VOMITING;  Start 4/26/19 

at 08:30


Ondansetron HCl (Zofran Odt) 4 mg PRN Q6HRS  PRN PO NAUSEA/VOMITING;  Start 

4/26/19 at 08:30


Lidocaine HCl (Xylocaine-Mpf 1% 2ml Vial) 2 ml STK-MED ONCE .ROUTE ;  Start 

4/26/19 at 09:09;  Stop 4/26/19 at 09:10;  Status DC


Iohexol (Omnipaque 300 Mg/ml) 100 ml STK-MED ONCE .ROUTE ;  Start 4/26/19 at 

09:10;  Stop 4/26/19 at 09:11;  Status DC


Heparin Sodium/ Sodium Chloride 500 ml @  As Directed STK-MED ONCE .ROUTE ;  

Start 4/26/19 at 09:10;  Stop 4/26/19 at 09:11;  Status DC


Fentanyl Citrate (Fentanyl 2ml Vial) 100 mcg STK-MED ONCE .ROUTE ;  Start 

4/26/19 at 09:21;  Stop 4/26/19 at 09:22;  Status DC


Midazolam HCl (Versed) 2 mg STK-MED ONCE .ROUTE ;  Start 4/26/19 at 09:21;  Stop

4/26/19 at 09:22;  Status DC


Heparin Sodium (Porcine) (Heparin Sodium) 10,000 unit STK-MED ONCE .ROUTE ;  

Start 4/26/19 at 09:21;  Stop 4/26/19 at 09:22;  Status DC


Verapamil HCl (Verapamil) 5 mg STK-MED ONCE .ROUTE ;  Start 4/26/19 at 09:21;  

Stop 4/26/19 at 09:22;  Status DC


Nitroglycerin (Nitroglycerin) 200 mcg STK-MED ONCE .ROUTE ;  Start 4/26/19 at 

09:22;  Stop 4/26/19 at 09:23;  Status DC


Nitroglycerin (Nitroglycerin) 200 mcg 1X  ONCE IART ;  Start 4/26/19 at 09:30;  

Stop 4/26/19 at 09:33;  Status DC


Verapamil HCl (Verapamil) 2.5 mg 1X  ONCE IART ;  Start 4/26/19 at 09:30;  Stop 

4/26/19 at 09:33;  Status DC


Heparin Sodium (Porcine) (Heparin Sodium) 2,500 unit 1X  ONCE IART ;  Start 

4/26/19 at 09:30;  Stop 4/26/19 at 09:33;  Status DC


Heparin Sodium/ Sodium Chloride (HEPARIN for ARTERIAL LINE FLUSH) 1,000 unit 1X 

ONCE IART ;  Start 4/26/19 at 09:30;  Stop 4/26/19 at 09:33;  Status DC


Iohexol (Omnipaque 300 Mg/ml) 50 ml 1X  ONCE IART ;  Start 4/26/19 at 09:30;  

Stop 4/26/19 at 09:33;  Status DC


Lidocaine HCl (Xylocaine-Mpf 1% 2ml Vial) 2 ml 1X  ONCE INJ ;  Start 4/26/19 at 

09:30;  Stop 4/26/19 at 09:33;  Status DC





Active Scripts


Active


Aspirin Ec (Aspirin) 325 Mg Tablet.dr 325 Mg PO DAILYWBKFT 30 Days


Amlodipine Besylate 10 Mg Tablet 10 Mg PO DAILY 30 Days


Isosorbide Mononitrate Er (Isosorbide Mononitrate) 30 Mg Tab.er.24h 30 Mg PO 

DAILY 30 Days


Hydralazine Hcl 50 Mg Tablet 50 Mg PO TID 30 Days


Atorvastatin Calcium 10 Mg Tablet 10 Mg PO QHS 30 Days


Reported


Hydrocodone-Apap 7.5-325  ** (Hydrocodone Bit/Acetaminophen) 1 Tab Tablet 1 Tab 

PO PRN Q6HRS PRN


Edarbyclor 40-25 Mg Tablet (Azilsartan/Chlorthalidone) 1 Each Tablet 1 Each PO 

DAILY


Vitals/I & O





Vital Sign - Last 24 Hours








 4/25/19 4/25/19 4/25/19 4/25/19





 11:00 13:52 13:53 15:00


 


Temp 98.0   97.7





 98.0   97.7


 


Pulse 66 66 66 71


 


Resp 18   18


 


B/P (MAP) 162/72 (102) 162/72 162/72 143/64 (90)


 


Pulse Ox 94   97


 


O2 Delivery Room Air   Room Air


 


    





    





 4/25/19 4/25/19 4/25/19 4/25/19





 19:02 20:01 21:31 22:09


 


Temp 98.2   98.1





 98.2   98.1


 


Pulse 66  66 64


 


Resp 16   16


 


B/P (MAP) 148/65 (92)  148/65 137/60 (85)


 


Pulse Ox 94   93


 


O2 Delivery Room Air Room Air  Room Air


 


    





    





 4/26/19 4/26/19 4/26/19 





 02:06 07:00 08:48 


 


Temp 97.8 97.9  





 97.8 97.9  


 


Pulse 58 59 57 


 


Resp 16 18  


 


B/P (MAP) 147/64 (91) 168/74 (105) 172/78 


 


Pulse Ox 93 94  


 


O2 Delivery Room Air Room Air  














Intake and Output   


 


 4/25/19 4/25/19 4/26/19





 15:00 23:00 07:00


 


Intake Total  120 ml 540 ml


 


Output Total   250 ml


 


Balance  120 ml 290 ml

















BEN FALL MD           Apr 26, 2019 09:53

## 2019-04-26 NOTE — PDOC3
Discharge Summary


Visit Information


Date of Admission:  Apr 25, 2019


Date of Discharge:  Apr 26, 2019


Admitting Diagnosis Comment:


1. Accelerated HTN: 


2. Acute on chronic diastolic CHF: Appears compensated


3. s/p clean LHC (4/25)


5. Tobaccoism


6. HLP


7. Obesity


8. Hx of Asymptomatic sinus bradycardia: no pauses. 


9. Anxiety;


Final Diagnosis


Problems


Medical Problems:


(1) Elevated troponin


Status: Acute  





(2) Hypertension


Status: Acute  











Brief Hospital Course


Allergies





                                    Allergies








Coded Allergies Type Severity Reaction Last Updated Verified


 


  No Known Drug Allergies    8/18/15 No








Vital Signs





Vital Signs








  Date Time  Temp Pulse Resp B/P (MAP) Pulse Ox O2 Delivery O2 Flow Rate FiO2


 


4/26/19 14:53 98.1 67 20 129/62 (84) 94 Room Air  





 98.1       


 


4/26/19 10:12       2.0 








Lab Results





Laboratory Tests








Test


 4/24/19


19:45 4/25/19


01:00 4/25/19


07:00 4/26/19


05:15


 


White Blood Count


 8.4 x10^3/uL


(4.0-11.0) 


 


 5.9 x10^3/uL


(4.0-11.0)


 


Red Blood Count


 5.05 x10^6/uL


(3.50-5.40) 


 


 4.63 x10^6/uL


(3.50-5.40)


 


Hemoglobin


 15.5 g/dL


(12.0-15.5) 


 


 14.5 g/dL


(12.0-15.5)


 


Hematocrit


 46.7 %


(36.0-47.0) 


 


 42.6 %


(36.0-47.0)


 


Mean Corpuscular Volume 92 fL ()    92 fL () 


 


Mean Corpuscular Hemoglobin 31 pg (25-35)    31 pg (25-35) 


 


Mean Corpuscular Hemoglobin


Concent 33 g/dL


(31-37) 


 


 34 g/dL


(31-37)


 


Red Cell Distribution Width


 14.1 %


(11.5-14.5) 


 


 13.9 %


(11.5-14.5)


 


Platelet Count


 229 x10^3/uL


(140-400) 


 


 217 x10^3/uL


(140-400)


 


Neutrophils (%) (Auto) 70 % (31-73)    61 % (31-73) 


 


Lymphocytes (%) (Auto) 19 % (24-48)    20 % (24-48) 


 


Monocytes (%) (Auto) 6 % (0-9)    13 % (0-9) 


 


Eosinophils (%) (Auto) 4 % (0-3)    6 % (0-3) 


 


Basophils (%) (Auto) 1 % (0-3)    1 % (0-3) 


 


Neutrophils # (Auto)


 5.9 x10^3uL


(1.8-7.7) 


 


 3.6 x10^3uL


(1.8-7.7)


 


Lymphocytes # (Auto)


 1.6 x10^3/uL


(1.0-4.8) 


 


 1.1 x10^3/uL


(1.0-4.8)


 


Monocytes # (Auto)


 0.5 x10^3/uL


(0.0-1.1) 


 


 0.8 x10^3/uL


(0.0-1.1)


 


Eosinophils # (Auto)


 0.4 x10^3/uL


(0.0-0.7) 


 


 0.3 x10^3/uL


(0.0-0.7)


 


Basophils # (Auto)


 0.1 x10^3/uL


(0.0-0.2) 


 


 0.1 x10^3/uL


(0.0-0.2)


 


Prothrombin Time


 11.9 SEC


(11.7-14.0) 


 


 





 


Prothromb Time International


Ratio 0.9 (0.8-1.1) 


 


 


 





 


Sodium Level


 138 mmol/L


(136-145) 


 


 139 mmol/L


(136-145)


 


Potassium Level


 3.7 mmol/L


(3.5-5.1) 


 


 3.9 mmol/L


(3.5-5.1)


 


Chloride Level


 101 mmol/L


() 


 


 101 mmol/L


()


 


Carbon Dioxide Level


 27 mmol/L


(21-32) 


 


 27 mmol/L


(21-32)


 


Anion Gap 10 (6-14)    11 (6-14) 


 


Blood Urea Nitrogen


 14 mg/dL


(7-20) 


 


 14 mg/dL


(7-20)


 


Creatinine


 1.0 mg/dL


(0.6-1.0) 


 


 1.1 mg/dL


(0.6-1.0)


 


Estimated GFR


(Cockcroft-Gault) 69.9 


 


 


 62.6 





 


BUN/Creatinine Ratio 14 (6-20)    


 


Glucose Level


 92 mg/dL


(70-99) 


 


 109 mg/dL


(70-99)


 


Calcium Level


 9.5 mg/dL


(8.5-10.1) 


 


 9.3 mg/dL


(8.5-10.1)


 


Magnesium Level


 2.0 mg/dL


(1.8-2.4) 


 


 





 


Total Bilirubin


 0.3 mg/dL


(0.2-1.0) 


 


 





 


Aspartate Amino Transf


(AST/SGOT) 21 U/L (15-37) 


 


 


 





 


Alanine Aminotransferase


(ALT/SGPT) 43 U/L (14-59) 


 


 


 





 


Alkaline Phosphatase


 108 U/L


() 


 


 





 


Creatine Kinase


 161 U/L


() 


 


 





 


Creatine Kinase MB (Mass)


 3.3 ng/mL


(0.0-3.6) 


 


 





 


Creatine Kinase MB Relative


Index 2.0 % (0-4) 


 


 


 





 


Troponin I Quantitative


 0.135 ng/mL


(0.000-0.055) 0.145 ng/mL


(0.000-0.055) 0.163 ng/mL


(0.000-0.055) 





 


NT-Pro-B-Type Natriuretic


Peptide 494 pg/mL


(0-124) 


 


 





 


Total Protein


 7.4 g/dL


(6.4-8.2) 


 


 





 


Albumin


 3.5 g/dL


(3.4-5.0) 


 


 





 


Albumin/Globulin Ratio 0.9 (1.0-1.7)    








Laboratory Tests








Test


 4/26/19


05:15


 


White Blood Count


 5.9 x10^3/uL


(4.0-11.0)


 


Red Blood Count


 4.63 x10^6/uL


(3.50-5.40)


 


Hemoglobin


 14.5 g/dL


(12.0-15.5)


 


Hematocrit


 42.6 %


(36.0-47.0)


 


Mean Corpuscular Volume 92 fL () 


 


Mean Corpuscular Hemoglobin 31 pg (25-35) 


 


Mean Corpuscular Hemoglobin


Concent 34 g/dL


(31-37)


 


Red Cell Distribution Width


 13.9 %


(11.5-14.5)


 


Platelet Count


 217 x10^3/uL


(140-400)


 


Neutrophils (%) (Auto) 61 % (31-73) 


 


Lymphocytes (%) (Auto) 20 % (24-48) 


 


Monocytes (%) (Auto) 13 % (0-9) 


 


Eosinophils (%) (Auto) 6 % (0-3) 


 


Basophils (%) (Auto) 1 % (0-3) 


 


Neutrophils # (Auto)


 3.6 x10^3uL


(1.8-7.7)


 


Lymphocytes # (Auto)


 1.1 x10^3/uL


(1.0-4.8)


 


Monocytes # (Auto)


 0.8 x10^3/uL


(0.0-1.1)


 


Eosinophils # (Auto)


 0.3 x10^3/uL


(0.0-0.7)


 


Basophils # (Auto)


 0.1 x10^3/uL


(0.0-0.2)


 


Sodium Level


 139 mmol/L


(136-145)


 


Potassium Level


 3.9 mmol/L


(3.5-5.1)


 


Chloride Level


 101 mmol/L


()


 


Carbon Dioxide Level


 27 mmol/L


(21-32)


 


Anion Gap 11 (6-14) 


 


Blood Urea Nitrogen


 14 mg/dL


(7-20)


 


Creatinine


 1.1 mg/dL


(0.6-1.0)


 


Estimated GFR


(Cockcroft-Gault) 62.6 





 


Glucose Level


 109 mg/dL


(70-99)


 


Calcium Level


 9.3 mg/dL


(8.5-10.1)








Brief Hospital Course


Ms. Anthony  is a 53 old AA female, admitted for cp with mixed features. SOme 

anxiety, BP high, so C done which was clean, Cleared for home today


2 notes.


Dispo: home


Proc; [facility]





Discharge Information


Condition at Discharge:  Improved, Stable


Follow Up:  Weeks (pcp prn)


Disposition/Orders:  D/C to Home


Scheduled


Amlodipine Besylate (Amlodipine Besylate) 10 Mg Tablet, 10 MG PO DAILY for htn 

for 30 Days, #30


   Prescribed by: SANTIAGO CROUCH MD on 4/11/19 1222


   Last Action: Continued on 4/24/19 2214 by RIMMA SIMMONS


Aspirin (Aspirin Ec) 325 Mg Tablet.dr 325 MG PO DAILYWBKFT for antiplatelet for

30 Days, #30


   Prescribed by: SANTIAGO CROUCH MD on 4/11/19 1222


   Last Action: Continued on 4/24/19 2214 by NIAMICHI SIMMONS


Atorvastatin Calcium (Atorvastatin Calcium) 10 Mg Tablet, 10 MG PO QHS for 

dyslipidemia for 30 Days, #30


   Prescribed by: SANTIAGO CROUCH MD on 4/11/19 1222


   Last Action: Continued on 4/24/19 2214 by RIMMA SIMMONS


Azilsartan Med/Chlorthalidone (Edarbyclor 40-25 Mg Tablet) 1 Each Tablet, 1 EACH

PO DAILY, (Reported)


   Entered as Reported by: NEGRA ENGEL on 7/30/15 1429


   Last Action: Converted on 4/24/19 2214 by RIMMA SIMMONS


Hydralazine Hcl (Hydralazine Hcl) 50 Mg Tablet, 50 MG PO TID for htn for 30 

Days, #90


   Prescribed by: SANTIAGO CROUCH MD on 4/11/19 1222


   Last Action: Converted on 4/24/19 2214 by RIMMA SIMMONS


Isosorbide Mononitrate (Isosorbide Mononitrate Er) 30 Mg Tab.er.24h, 30 MG PO 

DAILY for htn for 30 Days, #30


   Prescribed by: SANTIAGO CROUCH MD on 4/11/19 1222


   Last Action: Continued on 4/24/19 2214 by RIMMA SIMMONS





Scheduled PRN


Hydrocodone Bit/Acetaminophen (Hydrocodone-Apap 7.5-325  **) 1 Tab Tablet, 1 TAB

PO PRN Q6HRS PRN for PAIN, Ref 0 (Reported)


   Entered as Reported by: TOMASZ GUAN on 4/9/19 1736


   Last Action: Continued on 4/24/19 2214 by BEN RAMOS MD           Apr 26, 2019 15:10

## 2019-04-26 NOTE — CARD
MR#: [id number]

Account#: [id number]

Accession#: [id number]

Date of Study: 04/26/2019

Ordering Physician: RUIZ ARROYO, 

Referring Physician: RIMMA SIMMONS 

Tech: LICHA DAVID RTR





--------------- APPROVED REPORT --------------





Technologist: LICHA DAVID RTR

Nurse: RAFAEL MILLER RN



Procedure(s) performed: Left heart catheterization, selective coronary angiography, left ventriculogr
aphy and aortogram via right transradial approach

Moderate sedation time:32 mins

Fluoro time:6.1 MIN

Dose:66 GYCM2

Contrast:180



HISTORY

: Unstable angina and resistant hypertension.



[facility] Clinical Frailty Scale

[facility] Clinical Frailty Scale: Very Fit



Heart Failure

Heart Failure: Yes

If Yes, Newly Diagnosed: No

If Yes, HF Type: Diastolic     

If Yes, NYHA Class: Class III     



PROCEDURE NARRATIVE

After explaining the risks, benefits and alternative options, informed consent was obtained from rogelio
ent.  Patient was brought to the cardiac Cath Lab and right wrist was prepped and draped in the usual
 fashion after confirming a positive modified Renan's test.  Arterial access was obtained in the righ
t radial artery and a 6 Persian sheath was inserted.  6 Persian Buzz catheter was used to perform jennifer
ective angiography of the right coronary artery and left ventriculography. 6 Persian JL 3.5 catheter w
as used to perform selective angiography of the left coronary artery.  Finally, 5 Persian pigtail cath
eter was used to perform descending aortogram to rule out renal artery stenosis. Patient tolerated th
e procedure well.  Hemostasis was achieved using TR band.  There were no immediate complications.  Th
e following findings were noted.



FINDINGS

1.  Hemodynamics: Left ventricular end-diastolic pressure of 18 mmHg.  No pullback gradient across th
e aortic valve.

2.  Left ventriculography:  Normal left ventricle systolic function with ejection fraction estimated 
at 60%.  No significant mitral regurgitation seen.

3.  Coronary angiography:

a.  The left main coronary artery arose from the left sinus of Valsalva, gave rise to the left anteri
or descending and left circumflex arteries and did not show any significant stenosis.

b.  The left anterior descending artery did not show any significant stenosis.

c.  The left circumflex artery did not show any significant stenosis.

d.  The right coronary artery was a large and dominant vessel arising from the right sinus of Valsalv
a that did not show any significant stenosis.

e.  Aortogram did not show any significant stenosis involving right or left renal arteries.



Conclusion

1.  No significant coronary artery disease

2.  No significant renal artery stenosis

3.  Normal left ventricle systolic function with ejection fraction estimated at 60%



Signed by : Noble Mcintosh, 

Electronically Approved : 04/26/2019 10:24:37

## 2019-04-26 NOTE — NUR
SS following up with discharge planning. PT/OT evaluated pt and recommended home. No 
discharge needs noted at this time. SS will continue to follow for pending discharge needs.

## 2019-04-26 NOTE — PDOC
MODERATE SEDATION ASSESSMENT


RISKS/ALTERNATIVES


Risks/Alternatives


Risks and alternatives of this type of sedation and procedure discussed with:


RISK/ALTERNATIVES:  Patient





H & P ON CHART


H & P


H & P on chart and reviewed for co-morbid conditions and appropriate labs.


H&P ON CHART:  Yes





PREGNANCY STATUS


PREG STATUS ASSESSED:  N/A





MEDS/ALLERGIES REVIEWED


Meds/Allergies Reviewed


Medications and Allergies including time and route of recently administered 

narcotics and sedatives.


MEDS/ALLERGIES REVIEWED:  Yes





ASA RATING


ASA RATING:  II





AIRWAY ASSESSMENT


Airway Assessment


Airway patency, oral function limitations, presence  of caps, crowns, dentures, 

partials, and ability to extend neck assessed.


AIRWAY ASSESSMENT:  Yes





MALLAMPATI SCORE


MALLAMPATI SCORE:  II





PRE-SEDATION ASSESSMENT


PRE-SEDATION ASSESSMENT:  Yes











CODY JUAREZ MD           Apr 26, 2019 10:15

## 2019-09-24 ENCOUNTER — HOSPITAL ENCOUNTER (OUTPATIENT)
Dept: HOSPITAL 61 - SURGPAT | Age: 54
Discharge: HOME | End: 2019-09-24
Attending: NEUROLOGICAL SURGERY
Payer: COMMERCIAL

## 2019-09-24 DIAGNOSIS — M50.121: ICD-10-CM

## 2019-09-24 DIAGNOSIS — M48.02: ICD-10-CM

## 2019-09-24 DIAGNOSIS — Z01.818: Primary | ICD-10-CM

## 2019-09-24 LAB
ALBUMIN SERPL-MCNC: 3.5 G/DL (ref 3.4–5)
ALBUMIN/GLOB SERPL: 1 {RATIO} (ref 1–1.7)
ALP SERPL-CCNC: 80 U/L (ref 46–116)
ALT SERPL-CCNC: 21 U/L (ref 14–59)
ANION GAP SERPL CALC-SCNC: 7 MMOL/L (ref 6–14)
AST SERPL-CCNC: 15 U/L (ref 15–37)
BASOPHILS # BLD AUTO: 0.1 X10^3/UL (ref 0–0.2)
BASOPHILS NFR BLD: 1 % (ref 0–3)
BILIRUB SERPL-MCNC: 0.5 MG/DL (ref 0.2–1)
BUN SERPL-MCNC: 13 MG/DL (ref 7–20)
BUN/CREAT SERPL: 14 (ref 6–20)
CALCIUM SERPL-MCNC: 9.4 MG/DL (ref 8.5–10.1)
CHLORIDE SERPL-SCNC: 106 MMOL/L (ref 98–107)
CO2 SERPL-SCNC: 28 MMOL/L (ref 21–32)
CREAT SERPL-MCNC: 0.9 MG/DL (ref 0.6–1)
EOSINOPHIL NFR BLD: 0.2 X10^3/UL (ref 0–0.7)
EOSINOPHIL NFR BLD: 2 % (ref 0–3)
ERYTHROCYTE [DISTWIDTH] IN BLOOD BY AUTOMATED COUNT: 14.6 % (ref 11.5–14.5)
GFR SERPLBLD BASED ON 1.73 SQ M-ARVRAT: 78.7 ML/MIN
GLOBULIN SER-MCNC: 3.4 G/DL (ref 2.2–3.8)
GLUCOSE SERPL-MCNC: 70 MG/DL (ref 70–99)
HCT VFR BLD CALC: 41.3 % (ref 36–47)
HGB BLD-MCNC: 14 G/DL (ref 12–15.5)
LYMPHOCYTES # BLD: 1.1 X10^3/UL (ref 1–4.8)
LYMPHOCYTES NFR BLD AUTO: 16 % (ref 24–48)
MCH RBC QN AUTO: 32 PG (ref 25–35)
MCHC RBC AUTO-ENTMCNC: 34 G/DL (ref 31–37)
MCV RBC AUTO: 94 FL (ref 79–100)
MONO #: 0.7 X10^3/UL (ref 0–1.1)
MONOCYTES NFR BLD: 10 % (ref 0–9)
NEUT #: 4.9 X10^3/UL (ref 1.8–7.7)
NEUTROPHILS NFR BLD AUTO: 70 % (ref 31–73)
PLATELET # BLD AUTO: 190 X10^3/UL (ref 140–400)
POTASSIUM SERPL-SCNC: 4.4 MMOL/L (ref 3.5–5.1)
PROT SERPL-MCNC: 6.9 G/DL (ref 6.4–8.2)
RBC # BLD AUTO: 4.41 X10^6/UL (ref 3.5–5.4)
SODIUM SERPL-SCNC: 141 MMOL/L (ref 136–145)
WBC # BLD AUTO: 7 X10^3/UL (ref 4–11)

## 2019-09-24 PROCEDURE — 85025 COMPLETE CBC W/AUTO DIFF WBC: CPT

## 2019-09-24 PROCEDURE — 36415 COLL VENOUS BLD VENIPUNCTURE: CPT

## 2019-09-24 PROCEDURE — 87641 MR-STAPH DNA AMP PROBE: CPT

## 2019-09-24 PROCEDURE — 82306 VITAMIN D 25 HYDROXY: CPT

## 2019-09-24 PROCEDURE — 80053 COMPREHEN METABOLIC PANEL: CPT

## 2019-09-27 NOTE — HP
ADMIT DATE:  09/30/2019



PREOPERATIVE HISTORY AND PHYSICAL



DATE OF SURGERY:  09/30/2019



HISTORY OF PRESENT ILLNESS:  This is a pleasant 54-year-old who in 05/2015

underwent 2-level ACDF and did well from that.  She was doing well until 07/30

when she fell at work.  She says she developed some immediate severe neck

pain and stiffness along with severe headache.  She said that she fell and she

has been having difficulty looking at the left and that it causes severe left

shoulder pain.  She has been in physical therapy 3 times per week during August.

 She has been using ice.  She has been taking muscle relaxants as she tolerates.

 None of these are helpful significantly.  She does not notice unsteadiness. 

She feels though her arms are weak.



PAST MEDICAL HISTORY:  Hypertension, chest pain, headaches, migraines,

rheumatoid arthritis, swelling of limbs, ulcers.



PAST SURGICAL HISTORY:  ACDF C5-C6, C6-C7 in 05/2015, hip surgery.



FAMILY HISTORY:  Heart disease, diabetes, and hypertension.



SOCIAL HISTORY:  Employed at .  Single.  Smokes 1 pack per day for 25 years. 

Drinks alcohol 1-2 times per month.



ALLERGIES:  No known drug allergies.



CURRENT MEDICATIONS:  Lisinopril, clonidine, Tylenol, gabapentin, Flexeril.



REVIEW OF SYSTEMS:  A 12-point review of systems was obtained and is

noncontributory except as mentioned above.



PHYSICAL EXAMINATION:

NEUROSURGERY EXAMINATION:

GENERAL APPEARANCE:  Alert, pleasant, in moderately severe distress because of

neck and left shoulder pain.

HEENT:  Normocephalic and atraumatic.

NECK AND THYROID:  Mild-to-moderate tenderness with palpation of posterior

cervical region, well-healed incision.

SKIN:  Warm and dry.

MUSCULOSKELETAL:  Cervical paraspinal muscle bulk is normal, cervical range of

motion is markedly restricted, especially with turning her head to the left,

normal range of motion of the right upper extremity, restricted range of motion

of the left upper extremity because of pain.

EXTREMITIES:  No clubbing, cyanosis or edema.

NEUROLOGIC:  Alert and oriented x 3, normal recent and remote memory.  Strength

5/5 in bilateral upper and lower extremities except the left upper extremity in

which the strength is 4/5 diffusely, sensory was intact to light touch in the

upper and lower extremities except for decrease in the left forearm and hand. 

Reflexes are present and symmetric in the upper and lower extremities except for

a left biceps and triceps reflex, normal gait.



IMAGING:  I reviewed a cervical MRI scan.  On that study, at C4-C5,

there is a large new disk protrusion, which is in contact with cord, which

resulted in marked bilateral foraminal narrowing, worse on the left.  There is a

significant spinal stenosis at this level.  There are postoperative changes

present at C5-C6 and C6-C7.



ASSESSMENT:

1.  Cervical disk disorder at C4-C5 level with radiculopathy.

2.  Spinal stenosis, cervical region.

3.  Cervicalgia.



PLAN:  I have a number of thoughts about the patient.  I feel that she suffered

a work-related fall.  She has incapacitating severe neck and shoulder pain.

 She has significant cervical spinal stenosis.  My recommendation is that she

undergo an anterior cervical microdiscectomy and fusion at C4-C5.  I spoke with

her about surgery and the risks and expected postoperative course.  She

understands.  She would like to go ahead.  We will make the arrangements.

 



______________________________

ROBERTO CARLOS JAIN MD



DR:  KELLY/ronit  JOB#:  [id number] / [id number]

DD:  09/27/2019 14:24  DT:  09/27/2019 14:53

ALCIDES

## 2019-09-30 ENCOUNTER — HOSPITAL ENCOUNTER (OUTPATIENT)
Dept: HOSPITAL 61 - SURG | Age: 54
Setting detail: OBSERVATION
LOS: 1 days | Discharge: HOME | End: 2019-10-01
Attending: NEUROLOGICAL SURGERY | Admitting: NEUROLOGICAL SURGERY
Payer: COMMERCIAL

## 2019-09-30 VITALS — DIASTOLIC BLOOD PRESSURE: 64 MMHG | SYSTOLIC BLOOD PRESSURE: 142 MMHG

## 2019-09-30 VITALS — DIASTOLIC BLOOD PRESSURE: 80 MMHG | SYSTOLIC BLOOD PRESSURE: 171 MMHG

## 2019-09-30 VITALS — SYSTOLIC BLOOD PRESSURE: 147 MMHG | DIASTOLIC BLOOD PRESSURE: 66 MMHG

## 2019-09-30 VITALS — SYSTOLIC BLOOD PRESSURE: 164 MMHG | DIASTOLIC BLOOD PRESSURE: 82 MMHG

## 2019-09-30 VITALS — BODY MASS INDEX: 37.83 KG/M2 | HEIGHT: 65 IN | WEIGHT: 227.08 LBS

## 2019-09-30 VITALS — DIASTOLIC BLOOD PRESSURE: 81 MMHG | SYSTOLIC BLOOD PRESSURE: 159 MMHG

## 2019-09-30 VITALS — DIASTOLIC BLOOD PRESSURE: 76 MMHG | SYSTOLIC BLOOD PRESSURE: 152 MMHG

## 2019-09-30 VITALS — DIASTOLIC BLOOD PRESSURE: 78 MMHG | SYSTOLIC BLOOD PRESSURE: 156 MMHG

## 2019-09-30 VITALS — SYSTOLIC BLOOD PRESSURE: 173 MMHG | DIASTOLIC BLOOD PRESSURE: 82 MMHG

## 2019-09-30 VITALS — SYSTOLIC BLOOD PRESSURE: 176 MMHG | DIASTOLIC BLOOD PRESSURE: 88 MMHG

## 2019-09-30 VITALS — SYSTOLIC BLOOD PRESSURE: 169 MMHG | DIASTOLIC BLOOD PRESSURE: 88 MMHG

## 2019-09-30 DIAGNOSIS — Z82.49: ICD-10-CM

## 2019-09-30 DIAGNOSIS — I10: ICD-10-CM

## 2019-09-30 DIAGNOSIS — M48.02: Primary | ICD-10-CM

## 2019-09-30 DIAGNOSIS — G43.909: ICD-10-CM

## 2019-09-30 DIAGNOSIS — Z83.3: ICD-10-CM

## 2019-09-30 DIAGNOSIS — M06.9: ICD-10-CM

## 2019-09-30 DIAGNOSIS — Z87.891: ICD-10-CM

## 2019-09-30 DIAGNOSIS — M50.121: ICD-10-CM

## 2019-09-30 DIAGNOSIS — Z98.1: ICD-10-CM

## 2019-09-30 PROCEDURE — G0378 HOSPITAL OBSERVATION PER HR: HCPCS

## 2019-09-30 PROCEDURE — 22551 ARTHRD ANT NTRBDY CERVICAL: CPT

## 2019-09-30 PROCEDURE — 99406 BEHAV CHNG SMOKING 3-10 MIN: CPT

## 2019-09-30 PROCEDURE — A7015 AEROSOL MASK USED W NEBULIZE: HCPCS

## 2019-09-30 PROCEDURE — 96375 TX/PRO/DX INJ NEW DRUG ADDON: CPT

## 2019-09-30 PROCEDURE — 97116 GAIT TRAINING THERAPY: CPT

## 2019-09-30 PROCEDURE — C1713 ANCHOR/SCREW BN/BN,TIS/BN: HCPCS

## 2019-09-30 PROCEDURE — 20930 SP BONE ALGRFT MORSEL ADD-ON: CPT

## 2019-09-30 PROCEDURE — 88311 DECALCIFY TISSUE: CPT

## 2019-09-30 PROCEDURE — 96376 TX/PRO/DX INJ SAME DRUG ADON: CPT

## 2019-09-30 PROCEDURE — 20680 REMOVAL OF IMPLANT DEEP: CPT

## 2019-09-30 PROCEDURE — 76000 FLUOROSCOPY <1 HR PHYS/QHP: CPT

## 2019-09-30 PROCEDURE — 96374 THER/PROPH/DIAG INJ IV PUSH: CPT

## 2019-09-30 PROCEDURE — 88304 TISSUE EXAM BY PATHOLOGIST: CPT

## 2019-09-30 PROCEDURE — 97530 THERAPEUTIC ACTIVITIES: CPT

## 2019-09-30 PROCEDURE — 20936 SP BONE AGRFT LOCAL ADD-ON: CPT

## 2019-09-30 PROCEDURE — 97162 PT EVAL MOD COMPLEX 30 MIN: CPT

## 2019-09-30 PROCEDURE — G0379 DIRECT REFER HOSPITAL OBSERV: HCPCS

## 2019-09-30 RX ADMIN — DEXTROSE, SODIUM CHLORIDE, AND POTASSIUM CHLORIDE SCH MLS/HR: 5; .45; .15 INJECTION INTRAVENOUS at 23:48

## 2019-09-30 RX ADMIN — HYDROCODONE BITARTRATE AND ACETAMINOPHEN PRN TAB: 5; 325 TABLET ORAL at 23:10

## 2019-09-30 RX ADMIN — METHOCARBAMOL PRN MG: 750 TABLET ORAL at 20:41

## 2019-09-30 RX ADMIN — FENTANYL CITRATE PRN MCG: 50 INJECTION INTRAMUSCULAR; INTRAVENOUS at 11:40

## 2019-09-30 RX ADMIN — METHOCARBAMOL PRN MG: 750 TABLET ORAL at 13:11

## 2019-09-30 RX ADMIN — FENTANYL CITRATE PRN MCG: 50 INJECTION INTRAMUSCULAR; INTRAVENOUS at 12:28

## 2019-09-30 RX ADMIN — HYDROCODONE BITARTRATE AND ACETAMINOPHEN PRN TAB: 5; 325 TABLET ORAL at 18:00

## 2019-09-30 RX ADMIN — FENTANYL CITRATE PRN MCG: 50 INJECTION INTRAMUSCULAR; INTRAVENOUS at 11:55

## 2019-09-30 RX ADMIN — FENTANYL CITRATE PRN MCG: 50 INJECTION INTRAMUSCULAR; INTRAVENOUS at 11:45

## 2019-09-30 RX ADMIN — NICOTINE SCH PATCH: 21 PATCH, EXTENDED RELEASE TOPICAL at 15:30

## 2019-09-30 RX ADMIN — CEFAZOLIN SCH GM: 330 INJECTION, POWDER, FOR SOLUTION INTRAMUSCULAR; INTRAVENOUS at 16:33

## 2019-09-30 RX ADMIN — DEXTROSE, SODIUM CHLORIDE, AND POTASSIUM CHLORIDE SCH MLS/HR: 5; .45; .15 INJECTION INTRAVENOUS at 13:31

## 2019-09-30 RX ADMIN — DOCUSATE SODIUM SCH MG: 100 CAPSULE, LIQUID FILLED ORAL at 20:41

## 2019-09-30 NOTE — OP
DATE OF SURGERY:  09/30/2019



PREOPERATIVE DIAGNOSES:  Herniated cervical disc, C4-C5 with cervical stenosis

and cervical radiculopathy.



POSTOPERATIVE DIAGNOSES:  Herniated cervical disc, C4-C5 with cervical stenosis

and cervical radiculopathy.



OPERATION PERFORMED:  Anterior cervical microdiscectomy, C4-C5; anterior

cervical interbody fusion, C4-C5 with allograft and autograft bone; anterior

cervical plate, C4-C5.  The operation also included removal of hardware, C5. 

The operation was done with fluoroscopy, microscopy, microscopic dissection and

multimodality monitoring.



SURGEON: Kyle Jain M.D.



ASSISTANT:  TERE Murdock assisted with surgery.  She assisted with the

microdecompression as well as interbody fusion and placement of plate and cage

as well as closure.



OPERATIVE INDICATIONS:  The patient is a pleasant 54-year-old woman who on

05/2015 underwent a 2-level ACDF and did well from that.  She then developed

severe, recurrent pain after a fall at work.  She was found to have a large

herniated cervical disc at C4-C5 level above her previous fusion along with

significant stenosis. She did try physical therapy without any benefit.  Imaging

studies were as outlined above.  I recommended an anterior cervical discectomy

and fusion at C4-5.  I spoke with her about the surgery, the risks, technique

and expected postoperative course and she wished me to go ahead.



DESCRIPTION OF PROCEDURE:  Following general endotracheal anesthesia, the

patient was positioned supine on the operating room table.  The anterior

cervical region was then prepped and draped in standard fashion.  KALEB hose and

AV impulse boots were applied for DVT prophylaxis.  The microscope was draped. 

Fluoroscopy was draped and brought into field.  Monitoring was established. 

Ancef 2 grams was given less than 1 hour prior to initiation of the surgery. 

Using fluoroscopic guidance, an incision was made directly over the C4-5

interspace and dissected down through skin and subcutaneous tissue sharply

divided the platysma and then dissected around the medial aspect of the

sternocleidomastoid and carotid artery sheath down the anterior cervical

vertebral bodies.  I removed some of the scar from the C4-5 region and

visualized the plate at C5.  I placed self-retaining retractors lodged in the

longus colli muscle and brought in the microscope and remainder of surgery was

done with the microscope using microscopic technique.  I drilled the anterior

spurring and then performed a discectomy after incising anterior annulus with

#11 blade.  The disc was largely degenerated.  I scraped cartilaginous endplate 
and

worked posteriorly, there was considerable scarring and I drilled the posterior

spurring, trimmed this back with a 2 mm micro Kerrison, removed the annulus and

began to remove multiple disc fragments from both left and right sides,  some of

which were quite large.  As I worked, the region became very well decompressed. 

I was able to easily pass a blunt hook out through the neural foramen

bilaterally.  At this point, I had an excellent decompression.  I measured and

placed a 7 mm interbody fusion cage using the [employer] system.  This was packed

with allograft and autograft bone.  The autograft bone, I obtained from saving

the bone spurs that I drilled to create the exposure.  In order to

place the plate and the distraction pins, I did remove the screw in C5 as well

as the locking screw from the previous system.  At the end of the operation, I 
removed 

the screws.  The Spinal Elements plate seemed to fit very nicely

and was able to be placed without having to drill away or remove the plate at

C5, C6, C7.  I therefore used this plate and four 14 mm screws, which were 
placed.

 X-rays looked quite good.  I irrigated with antibiotic solution.  I closed the

wound in layers with absorbable sutures.  The platysma as a separate layer. 

Hemostasis was perfect.  After I removed the retractors, I irrigated copiously. 

I was quite pleased with the surgery.

 



______________________________

KYLE JAIN MD



DR:  KELLY/ronit  JOB#:  [id number] / [id number]

DD:  09/30/2019 11:19  DT:  09/30/2019 11:39

ALCIDES

## 2019-09-30 NOTE — NUR
Patient arrived to the unit around 1230 from PACU in a bed. O2 running at 2L. BP increased 
and Pulse low which is patients baseline. BP medications were taken this morning per 
patient. She is a pack a day smoker and requests a nicoderm patch to help her quit smoking. 
IV in left hand running LR from PACU without any complications. Pain rating anywhere from 
4-8 with her falling asleep while this nurse is talking to her. Dressing to right side of 
neck dry/intact with soft collar in place. Edema noted in BLE with pedal pules at +1 
bilaterally. Patient states the edema is chronic and she has been that way for years. 
Tingling and weakness noted in BUE/hands which patient states was there prior to surgery as 
well. She was able to ambulate to the restroom with minimal assist upon arrival to the unit. 
Family at bedside. Will continue to monitor.

## 2019-10-01 VITALS — SYSTOLIC BLOOD PRESSURE: 143 MMHG | DIASTOLIC BLOOD PRESSURE: 72 MMHG

## 2019-10-01 VITALS — SYSTOLIC BLOOD PRESSURE: 141 MMHG | DIASTOLIC BLOOD PRESSURE: 70 MMHG

## 2019-10-01 VITALS
DIASTOLIC BLOOD PRESSURE: 78 MMHG | DIASTOLIC BLOOD PRESSURE: 78 MMHG | SYSTOLIC BLOOD PRESSURE: 152 MMHG | SYSTOLIC BLOOD PRESSURE: 152 MMHG

## 2019-10-01 RX ADMIN — HYDROCODONE BITARTRATE AND ACETAMINOPHEN PRN TAB: 5; 325 TABLET ORAL at 11:08

## 2019-10-01 RX ADMIN — DOCUSATE SODIUM SCH MG: 100 CAPSULE, LIQUID FILLED ORAL at 07:59

## 2019-10-01 RX ADMIN — NICOTINE SCH PATCH: 21 PATCH, EXTENDED RELEASE TOPICAL at 08:10

## 2019-10-01 RX ADMIN — METHOCARBAMOL PRN MG: 750 TABLET ORAL at 07:59

## 2019-10-01 RX ADMIN — CEFAZOLIN SCH GM: 330 INJECTION, POWDER, FOR SOLUTION INTRAMUSCULAR; INTRAVENOUS at 07:58

## 2019-10-01 RX ADMIN — HYDROCODONE BITARTRATE AND ACETAMINOPHEN PRN TAB: 5; 325 TABLET ORAL at 04:57

## 2019-10-01 RX ADMIN — CEFAZOLIN SCH GM: 330 INJECTION, POWDER, FOR SOLUTION INTRAMUSCULAR; INTRAVENOUS at 00:54

## 2019-10-01 NOTE — NUR
Patient left the building around 1110 in a wheelchair with her sister. Discharge education 
was completed by this nurse, therapy, and Dr Mayra Murray. IV discontinued without 
any complications prior to discharge. Dressing changed to anterior neck with no concerns 
noted. Patient education was completed not only with the patient but her sister and brother. 
No concerns noted upon discharge.

## 2019-10-01 NOTE — PDOC
PROGRESS NOTES


Subjective


Subjective


POD #1 S/P ACDF C4-5


left arm pain improved, still some tingling in arm and hand





Objective


Objective





Vital Signs








  Date Time  Temp Pulse Resp B/P (MAP) Pulse Ox O2 Delivery O2 Flow Rate FiO2


 


10/1/19 08:04  57  156/81    


 


10/1/19 07:31      Room Air  


 


10/1/19 06:27 97.8  18  97   





 97.8       


 


9/30/19 15:20       1.0 














Intake and Output 


 


 10/1/19





 07:00


 


Intake Total 2000 ml


 


Output Total 825 ml


 


Balance 1175 ml


 


 


 


Intake Oral 600 ml


 


IV Total 900 ml


 


Blood Product IV Normal Saline Flush 500 ml


 


Output Urine Total 800 ml


 


Urine/Stool Mix 0 ml


 


Estimated Blood Loss 25 ml


 


# Voids 2











Physical Exam


General:  Alert, Oriented X3, Cooperative, No acute distress, Other (voice 

clear)


Neuro:  Normal speech, Other (BONDS)


Skin:  Other (incision dry, minimal swelling, collar on)





Plan


Plan of Care


ok to dc home 


f/u 2 weeks





Comment


Review of Relevant


I have reviewed the following items fide (where applicable) has been applied.


Medications





Current Medications


Ondansetron HCl (Zofran) 4 mg PRN Q6HRS  PRN IV NAUSEA/VOMITING;  Start 9/30/19 

at 07:00;  Stop 9/30/19 at 13:22;  Status DC


Fentanyl Citrate (Fentanyl 2ml Vial) 25 mcg PRN Q5MIN  PRN IV MILD PAIN 1-3;  

Start 9/30/19 at 07:00;  Stop 9/30/19 at 13:22;  Status DC


Fentanyl Citrate (Fentanyl 2ml Vial) 50 mcg PRN Q5MIN  PRN IV MODERATE TO SEVERE

PAIN Last administered on 9/30/19at 12:28;  Start 9/30/19 at 07:00;  Stop 

9/30/19 at 13:22;  Status DC


Morphine Sulfate (Morphine Sulfate) 1 mg PRN Q10MIN  PRN IV SEVERE PAIN 7-10;  

Start 9/30/19 at 07:00;  Stop 9/30/19 at 13:22;  Status DC


Ringer's Solution 1,000 ml @  30 mls/hr Q24H IV  Last administered on 9/30/19at 

08:02;  Start 9/30/19 at 07:00;  Stop 9/30/19 at 13:22;  Status DC


Lidocaine HCl (Xylocaine-Mpf 1% 2ml Vial) 2 ml PRN 1X  PRN ID PRIOR TO IV START;

 Start 9/30/19 at 07:00;  Stop 9/30/19 at 13:22;  Status DC


Hydromorphone HCl (Dilaudid) 0.5 mg PRN Q10MIN  PRN IV SEV PAIN, Second choice; 

Start 9/30/19 at 07:00;  Stop 9/30/19 at 13:22;  Status DC


Prochlorperazine Edisylate (Compazine) 5 mg PACU PRN  PRN IV NAUSEA, MRX1;  

Start 9/30/19 at 07:00;  Stop 9/30/19 at 13:22;  Status DC


Bacitracin 93717 unit/Sodium Chloride 1,000 ml @  1,000 mls/hr 1X  ONCE IRR  Las

t administered on 9/30/19at 09:38;  Start 9/30/19 at 06:00;  Stop 9/30/19 at 

06:59;  Status DC


Bupivacaine HCl/ Epinephrine Bitart (Sensorcain-Epi 0.5%-1:209663 Mpf) 30 ml 1X 

ONCE INJ  Last administered on 9/30/19at 09:38;  Start 9/30/19 at 06:30;  Stop 9 /30/19 at 06:31;  Status DC


Cefazolin Sodium/ Dextrose 50 ml @  100 mls/hr 1X PREOP  PRN IV PRIOR TO 

PROCEDURE Last administered on 9/30/19at 09:20;  Start 9/30/19 at 06:00;  Stop 

9/30/19 at 18:00;  Status DC


Gelatin (Gelfoam  Size 12-7mm) 1 each STK-MED ONCE .ROUTE  Last administered on 

9/30/19at 09:38;  Start 9/30/19 at 07:39;  Stop 9/30/19 at 07:39;  Status DC


Thrombin 20,000 unit STK-MED ONCE TP  Last administered on 9/30/19at 09:38;  

Start 9/30/19 at 07:39;  Stop 9/30/19 at 07:39;  Status DC


Gelatin (Gelfoam  Size 12-7mm) 1 each STK-MED ONCE .ROUTE ;  Start 9/30/19 at 

07:39;  Stop 9/30/19 at 07:40;  Status DC


Glycopyrrolate (Robinul) 1 mg STK-MED ONCE .ROUTE ;  Start 9/30/19 at 08:16;  

Stop 9/30/19 at 08:17;  Status DC


Rocuronium Bromide (Zemuron) 50 mg STK-MED ONCE .ROUTE ;  Start 9/30/19 at 

08:17;  Stop 9/30/19 at 08:17;  Status DC


Midazolam HCl (Versed) 2 mg STK-MED ONCE .ROUTE ;  Start 9/30/19 at 08:17;  Stop

9/30/19 at 08:17;  Status DC


Remifentanil HCl (Ultiva) 2 mg STK-MED ONCE IV ;  Start 9/30/19 at 08:17;  Stop 

9/30/19 at 08:17;  Status DC


Desflurane (Suprane) 90 ml STK-MED ONCE IH ;  Start 9/30/19 at 08:20;  Stop 

9/30/19 at 08:20;  Status DC


Dexamethasone Sodium Phosphate (Decadron) 20 mg STK-MED ONCE .ROUTE ;  Start 

9/30/19 at 08:20;  Stop 9/30/19 at 08:20;  Status DC


Propofol 20 ml @ As Directed STK-MED ONCE IV ;  Start 9/30/19 at 08:20;  Stop 

9/30/19 at 08:20;  Status DC


Propofol 50 ml @ As Directed STK-MED ONCE IV ;  Start 9/30/19 at 08:20;  Stop 

9/30/19 at 08:20;  Status DC


Ondansetron HCl (Zofran) 4 mg STK-MED ONCE .ROUTE ;  Start 9/30/19 at 08:20;  

Stop 9/30/19 at 08:20;  Status DC


Phenylephrine HCl (Michele-Synephrine Inj) 10 mg STK-MED ONCE .ROUTE ;  Start 

9/30/19 at 08:20;  Stop 9/30/19 at 08:20;  Status DC


Propofol 50 ml @ As Directed STK-MED ONCE IV ;  Start 9/30/19 at 09:40;  Stop 

9/30/19 at 09:41;  Status DC


Hydralazine HCl (Apresoline Inj) 20 mg STK-MED ONCE .ROUTE ;  Start 9/30/19 at 

10:24;  Stop 9/30/19 at 10:24;  Status DC


Alprazolam (Xanax) 0.5 mg DAILY PO  Last administered on 10/1/19at 07:59;  Start

9/30/19 at 12:00


Amlodipine Besylate (Norvasc) 10 mg DAILY PO  Last administered on 10/1/19at 

08:04;  Start 10/1/19 at 09:00


Atorvastatin Calcium (Lipitor) 10 mg QHS PO  Last administered on 9/30/19at 

20:41;  Start 9/30/19 at 21:00


Hydralazine HCl (Apresoline) 50 mg TID PO  Last administered on 10/1/19at 08:02;

 Start 9/30/19 at 15:00


Isosorbide Mononitrate (Imdur) 60 mg DAILY PO  Last administered on 10/1/19at 

08:03;  Start 10/1/19 at 09:00


Sertraline HCl (Zoloft) 50 mg DAILY PO  Last administered on 10/1/19at 07:59;  

Start 10/1/19 at 09:00


Fentanyl Citrate (Fentanyl 2ml Vial) 50 mcg PRN Q2HR  PRN IV PAIN Last ad

ministered on 9/30/19at 13:15;  Start 9/30/19 at 10:30


Acetaminophen (Tylenol) 650 mg PRN Q6HRS  PRN PO HEADACHE / TEMP;  Start 9/30/19

at 10:30


Al Hydroxide/Mg Hydroxide (Mylanta Plus Xs) 30 ml PRN Q3HRS  PRN PO HEARTBURN / 

GAS;  Start 9/30/19 at 10:30


Calcium Carbonate/ Glycine (Tums) 500 mg PRN Q3HRS  PRN PO INDIGESTION;  Start 

9/30/19 at 10:30


Diphenhydramine HCl (Benadryl) 25 mg PRN Q6HRS  PRN PO ITCHING;  Start 9/30/19 

at 10:30


Naloxone HCl (Narcan) 0.1 mg PRN Q2MIN  PRN IV ADMIN;  Start 9/30/19 at 10:30


Sodium Chloride (Normal Saline Flush) 3 ml QSHIFT  PRN IV AFTER MEDS AND BLOOD 

DRAWS;  Start 9/30/19 at 10:30


Potassium Chloride/Dextrose/ Sod Cl 1,000 ml @  75 mls/hr [id number] IV  Last 

administered on 9/30/19at 13:31;  Start 9/30/19 at 10:28;  Stop 10/1/19 at 

06:12;  Status DC


Naloxone HCl (Narcan) 0.4 mg PRN Q2MIN  PRN IV SEE INSTRUCTIONS;  Start 9/30/19 

at 10:30


Acetaminophen/ Hydrocodone Bitart (Lortab 5/325) 1 tab PRN Q4HRS  PRN PO MILD 

PAIN 1-3 Last administered on 10/1/19at 04:57;  Start 9/30/19 at 10:30


Acetaminophen/ Hydrocodone Bitart (Lortab 5/325) 2 tab PRN Q4HRS  PRN PO 

MODERATE PAIN, SEVERE PAIN Last administered on 9/30/19at 18:00;  Start 9/30/19 

at 10:30


Methocarbamol (Robaxin) 750 mg PRN TID  PRN PO MUSCLE SPASMS Last administered 

on 10/1/19at 07:59;  Start 9/30/19 at 10:30


Docusate Sodium (Colace) 100 mg BID PO  Last administered on 10/1/19at 07:59;  

Start 9/30/19 at 21:00


Magnesium Hydroxide (Milk Of Magnesia) 2,400 mg PRN Q12HR  PRN PO CONSTIPATION; 

Start 9/30/19 at 10:30


Ondansetron HCl (Zofran) 4 mg PRN Q6HRS  PRN IV NAUESA, 1ST CHOICE;  Start 

9/30/19 at 10:30


Cefazolin Sodium (Ancef) 1 gm Q8H IVP  Last administered on 10/1/19at 07:58;  

Start 9/30/19 at 17:00;  Stop 10/1/19 at 09:01;  Status DC


Neostigmine Methylsulfate (Neostigmine Methylsulfate) 5 mg STK-MED ONCE .ROUTE ;

 Start 9/30/19 at 11:10;  Stop 9/30/19 at 11:11;  Status DC


Fentanyl Citrate (Fentanyl 2ml Vial) 100 mcg STK-MED ONCE .ROUTE ;  Start 

9/30/19 at 11:43;  Stop 9/30/19 at 11:43;  Status DC


Fentanyl Citrate (Fentanyl 2ml Vial) 100 mcg STK-MED ONCE .ROUTE ;  Start 

9/30/19 at 11:52;  Stop 9/30/19 at 11:52;  Status DC


Nicotine (Nicoderm Cq 21mg) 1 patch DAILY TD  Last administered on 9/30/19at 

15:30;  Start 9/30/19 at 14:30





Active Scripts


Active


Isosorbide Mononitrate Er (Isosorbide Mononitrate) 30 Mg Tab.er.24h 60 Mg PO 

DAILY MDD 1


Amlodipine Besylate 10 Mg Tablet 10 Mg PO DAILY 30 Days


Hydralazine Hcl 50 Mg Tablet 50 Mg PO TID 30 Days


Atorvastatin Calcium 10 Mg Tablet 10 Mg PO QHS 30 Days


Reported


Zoloft (Sertraline Hcl) 50 Mg Tablet 1 Tab PO DAILY


Alprazolam 0.5 Mg Tablet 1 Tab PO DAILY


Vitals/I & O





Vital Sign - Last 24 Hours








 9/30/19 9/30/19 9/30/19 9/30/19





 11:30 11:30 11:40 11:45


 


Temp  97.9  





  97.9  


 


Pulse  70  62


 


Resp  16 16 20


 


B/P (MAP)  179/77  187/83


 


Pulse Ox  100 98 100


 


O2 Delivery Mask Simple Mask Simple Mask Simple Mask


 


O2 Flow Rate 8 8 8.0 8


 


    





    





 9/30/19 9/30/19 9/30/19 9/30/19





 11:45 11:55 12:00 12:15


 


Temp    97.9





    97.9


 


Pulse   60 58


 


Resp 16 16 18 16


 


B/P (MAP)   185/81 173/78


 


Pulse Ox 98 97 93 98


 


O2 Delivery Simple Mask Simple Mask Room Air Nasal Cannula


 


O2 Flow Rate 8.0 8.0  2


 


    





    





 9/30/19 9/30/19 9/30/19 9/30/19





 12:28 12:28 12:30 12:45


 


Temp   97.3 





   97.3 


 


Pulse   57 57


 


Resp 16  16 


 


B/P (MAP)   173/82 (112) 171/80 (110)


 


Pulse Ox 96  100 


 


O2 Delivery Nasal Cannula Nasal Cannula Nasal Cannula 


 


O2 Flow Rate 2.0 2 2.0 


 


    





    





 9/30/19 9/30/19 9/30/19 9/30/19





 12:58 13:00 13:10 13:15


 


Pulse  61  62


 


B/P (MAP)  176/88 (117)  169/88 (115)


 


Pulse Ox 96   100


 


O2 Delivery Nasal Cannula  Nasal Cannula Nasal Cannula


 


O2 Flow Rate 2.0  2.0 3.0





 9/30/19 9/30/19 9/30/19 9/30/19





 13:15 13:45 13:51 14:16


 


Pulse  58  56


 


B/P (MAP)  159/81 (107)  152/76 (101)


 


Pulse Ox 96  100 100


 


O2 Delivery Nasal Cannula Nasal Cannula Nasal Cannula Nasal Cannula


 


O2 Flow Rate 3.0 1.0 1.0 1.0





 9/30/19 9/30/19 9/30/19 9/30/19





 15:20 15:32 16:15 18:00


 


Pulse 56 58 56 


 


B/P (MAP) 156/78 (104) 152/79 164/82 (109) 


 


Pulse Ox 100  98 


 


O2 Delivery Nasal Cannula  Nasal Cannula Room Air


 


O2 Flow Rate 1.0   





 9/30/19 9/30/19 9/30/19 9/30/19





 18:04 19:00 20:41 23:00


 


Temp 97.8   98.0





 97.8   98.0


 


Pulse 61  61 59


 


Resp 16 20  16


 


B/P (MAP) 147/66 (93)  147/66 142/64 (90)


 


Pulse Ox 96   92


 


O2 Delivery Room Air Room Air  Room Air


 


    





    





 9/30/19 10/1/19 10/1/19 10/1/19





 23:10 00:10 03:00 04:57


 


Temp   98.0 





   98.0 


 


Pulse   59 


 


Resp 16 16 24 24


 


B/P (MAP)   141/70 (93) 


 


Pulse Ox 92  92 


 


O2 Delivery Room Air  Room Air 


 


    





    





 10/1/19 10/1/19 10/1/19 10/1/19





 06:10 06:27 07:31 08:02


 


Temp  97.8  





  97.8  


 


Pulse  59  57


 


Resp 20 18  


 


B/P (MAP)  143/72 (95)  156/81


 


Pulse Ox  97  


 


O2 Delivery  Room Air Room Air 


 


    





    





 10/1/19 10/1/19  





 08:03 08:04  


 


Pulse 57 57  


 


B/P (MAP) 156/81 156/81  














Intake and Output   


 


 9/30/19 9/30/19 10/1/19





 15:00 23:00 07:00


 


Intake Total 900 ml  1100 ml


 


Output Total 25 ml 800 ml 0 ml


 


Balance 875 ml -800 ml 1100 ml

















CORINA PACHECO           Oct 1, 2019 09:46

## 2019-10-01 NOTE — DISCH
DISCHARGE INSTRUCTIONS


Condition on Discharge


Condition on Discharge:  Stable





Activity After Discharge


Activity Instructions for Disc:  Activity as tolerated, Avoid exertion, Prog

ressive ambulation


Other activity instructions:  Wear the soft collar for 1 week or until released 

by your doctor.


Bathing Instructions:  Shower-keep dressing dry, No Tub Bath until see 


Lifting Instructions after Dis:  No heavy lifting, No pulling or pushing, Do not

lift >10 pounds


Exercise Instruction after Dis:  Walk 30 min, 3 x per week, Exercise per 

therapy, Progress as tolerated


Driving Instructions after Dis:  No driving for 2 weeks


Weight Bearing Status after Di:  No restrictions





Diet after Discharge


Diet after Discharge:  Cardiac, No Added Salt, Regular


Diet Texture:  Regular


Swallowing Supervision:  None needed





Wound Incision Care


Wound/Incision Care:  Ice to area for comfort, Keep wound/cast CDI, Change 

dressing, May get incision wet


Other wound/incision instructi:  May shower after 48 hours, remove dressing & 

replace if desired.


Wound Care Equipment:  Dressings





Checks after Discharge


Checks after discharge:  Check blood press - daily, Weigh Yourself Daily





Contacting the [facility] after DC


Call your doctor for:  Concerns you may have





Follow-Up


Follow Up With:  Dr Jain's nurse in 10-14 days 





Treatment/Equipment after DC


Adaptive Equipment Issued:  None, Front wheeled walker


Comment:  Wear a 21mg Nicotine patch to help quit smoking











ROBERTO CARLOS JAIN MD             Oct 1, 2019 10:02

## 2019-10-01 NOTE — PATHOLOGY
[facility] Accession Number: [id number]

.                                                                01

Material submitted:                                        .

vertebral column - CERVICAL DISC

.                                                                01

Clinical history:                                          .

Cervical stenosis, radiculopathy, herniated disc

.                                                                02

**********************************************************************

Diagnosis:

Segments of cartilaginous tissue and minute segments of bone, cervical

disc:

- Degenerative changes of cartilaginous tissue.

(JPM:; 10/01/2019)

MBR  10/01/2019  1258 Local

**********************************************************************

.                                                                02

Comment:

There is no evidence of an acute inflammatory process or malignancy.

(JPM:; 10/01/2019)

.                                                                02

Electronically signed:                                     .

Davin Mcclain MD, Pathologist

NPI- [id number]

.                                                                01

Gross description:                                         .

The specimen is received in formalin, labeled "Gabrielle, Dinah, cervical

disc" and consists of multiple fragments of white-pink tissue and bone

measuring 3.5 x 3.0 x 0.5 cm in aggregate.  A representative portion is

submitted in A1 following decalcification.

(SDY; 9/30/2019)

SYU/SYU  09/30/2019  1705 Local

.                                                                02

Pathologist provided ICD-10:

M50.30

.                                                                02

CPT                                                        .

187283, 406940

Specimen Comment: A courtesy copy of this report has been sent to

Specimen Comment: [phone], [phone].

Specimen Comment: Report sent to  / DR FAYE

Specimen Comment: Report sent to 

***Performed at:  01

   [facility]

   [address], [place]  [id number]

   MD Cy Salvador MD Phone:  [phone]

***Performed at:  02

   [facility]

   [address], [place]  [id number]

   MD Davin Mcclain MD Phone:  [phone]

## 2019-10-01 NOTE — NUR
Discussed with patient in detail in regards to her nicotine patch and smoking. She stated 
she would like to quit but has no desire to at this time so she plans to have a cigarette 
upon discharge. A new nicotine patch was not placed this AM. Smoking Cessation paperwork and 
tips given to her. The paperwork was discussed in detail with the patient and her sister 
present. They both stated understanding and the patient stated she will continue to think 
about stopping smoking.

## 2020-07-27 ENCOUNTER — HOSPITAL ENCOUNTER (OUTPATIENT)
Dept: HOSPITAL 61 - ECHO | Age: 55
Discharge: HOME | End: 2020-07-27
Attending: INTERNAL MEDICINE
Payer: COMMERCIAL

## 2020-07-27 DIAGNOSIS — I08.8: Primary | ICD-10-CM

## 2020-07-27 DIAGNOSIS — I27.20: ICD-10-CM

## 2020-07-27 DIAGNOSIS — I50.32: ICD-10-CM

## 2020-07-27 PROCEDURE — 93306 TTE W/DOPPLER COMPLETE: CPT

## 2020-07-27 NOTE — CARD
MR#: [id number]

Account#: [id number]

Accession#: [id number]

Date of Study: 07/27/2020

Ordering Physician: CODY JUAREZ, 

Referring Physician: CODY JUAREZ 

Tech: Nereyda Cordero RDCS





--------------- APPROVED REPORT --------------





EXAM: Two-dimensional and M-mode echocardiogram with Doppler and color Doppler.



Other Information 

Quality : Good



INDICATION

Congestive Heart Failure 



2D DIMENSIONS 

RVDd3.8 (2.9-3.5cm)Left Atrium(2D)4.1 (1.6-4.0cm)

IVSd1.1 (0.7-1.1cm)Aortic Root(2D)2.7 (2.0-3.7cm)

LVDd4.9 (3.9-5.9cm)LVOT Diameter2.0 (1.8-2.4cm)

PWd1.1 (0.7-1.1cm)LVDs3.2 (2.5-4.0cm)

FS (%) 35.9 %SV74.9 ml

LVEF(%)65.3 (>50%)



Aortic Valve

AoV Peak Ajay.170.4cm/sAoV VTI35.6cm

AO Peak GR.11.6mmHgLVOT Peak Ajay.135.1cm/s

AO Mean GR.7mmHgAVA (VMAX)2.44cm2

SACHA   (VTI)2.60cm2



Mitral Valve

MV E Zqevdpiu95.9cm/sMV DECEL HVCC603nv

MV A Gdlmlomh87.8cm/sE/A  Ratio1.1



Tricuspid Valve

TR P. Utrbvvom082no/sRAP EETRLGDX0gdNi

TR Peak Gr.69uyTgJHYH53iaYf



Pulmonary Vein

S1 Xbkxcdrt21.7cm/sD2 Wrshykqd60.2cm/s



 LEFT VENTRICLE 

The left ventricle is normal size. There is normal left ventricular wall thickness. The left ventricu
lar systolic function is normal and the ejection fraction is within normal range. The Ejection Fracti
on is 55-60%. There is normal LV segmental wall motion. Transmitral Doppler flow pattern is Grade I-a
bnormal relaxation pattern.



 RIGHT VENTRICLE 

The right ventricle is normal size. The right ventricular systolic function is normal.



 ATRIA 

The left atrium is mildly dilated. The right atrium size is normal. The interatrial septum is intact 
with no evidence for an atrial septal defect or patent foramen ovale as noted on 2-D or Doppler imagi
ng.



 AORTIC VALVE 

The aortic valve is normal in structure and function. Doppler and Color Flow revealed no significant 
aortic regurgitation. There is no significant aortic valvular stenosis.



 MITRAL VALVE 

The mitral valve is calcified but opens well. Mitral annular calcification is mild. There is no evide
nce of mitral valve prolapse. There is no mitral valve stenosis. Doppler and Color-flow revealed trac
e mitral regurgitation.



 TRICUSPID VALVE 

The tricuspid valve is normal in structure and function. Doppler and Color Flow revealed trace to mil
d tricuspid regurgitation.There is moderate pulmonary hypertension. The PA pressure was estimated at 
56 mmHg. There is no tricuspid valve stenosis.



 PULMONIC VALVE 

The pulmonic valve is not well visualized. Doppler and Color Flow revealed trace to mild pulmonic annabelle
vular regurgitation. There is no pulmonic valvular stenosis.



 GREAT VESSELS 

The aortic root is normal in size. The ascending aorta is normal in size. The IVC is dilated and adama
apses >50% with inspiration.



 PERICARDIAL EFFUSION 

There is no evidence of significant pericardial effusion.



Critical Notification

Critical Value: No



<Conclusion>

The left ventricular systolic function is normal and the ejection fraction is within normal range. Th
e Ejection Fraction is 55-60%.

There is normal LV segmental wall motion.

Doppler and Color Flow revealed trace to mild tricuspid regurgitation.There is moderate pulmonary hyp
ertension. The PA pressure was estimated at 56 mmHg.



Signed by : Hira Perdomo, 

Electronically Approved : 07/27/2020 10:45:12

## 2020-07-28 ENCOUNTER — HOSPITAL ENCOUNTER (EMERGENCY)
Dept: HOSPITAL 61 - ER | Age: 55
Discharge: HOME | End: 2020-07-28
Payer: COMMERCIAL

## 2020-07-28 VITALS
SYSTOLIC BLOOD PRESSURE: 179 MMHG | DIASTOLIC BLOOD PRESSURE: 88 MMHG | SYSTOLIC BLOOD PRESSURE: 179 MMHG | SYSTOLIC BLOOD PRESSURE: 179 MMHG | DIASTOLIC BLOOD PRESSURE: 88 MMHG | DIASTOLIC BLOOD PRESSURE: 88 MMHG | SYSTOLIC BLOOD PRESSURE: 179 MMHG | DIASTOLIC BLOOD PRESSURE: 88 MMHG | DIASTOLIC BLOOD PRESSURE: 88 MMHG | DIASTOLIC BLOOD PRESSURE: 88 MMHG | SYSTOLIC BLOOD PRESSURE: 179 MMHG | SYSTOLIC BLOOD PRESSURE: 179 MMHG | DIASTOLIC BLOOD PRESSURE: 88 MMHG | SYSTOLIC BLOOD PRESSURE: 179 MMHG

## 2020-07-28 VITALS — HEIGHT: 65 IN | WEIGHT: 275.58 LBS | BODY MASS INDEX: 45.91 KG/M2
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
## 2020-07-28 DIAGNOSIS — R60.0: ICD-10-CM
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
## 2020-07-28 DIAGNOSIS — S82.52XA: Primary | ICD-10-CM

## 2020-07-28 DIAGNOSIS — R06.02: ICD-10-CM

## 2020-07-28 DIAGNOSIS — I10: ICD-10-CM

## 2020-07-28 DIAGNOSIS — M79.672: ICD-10-CM

## 2020-07-28 DIAGNOSIS — F17.200: ICD-10-CM

## 2020-07-28 DIAGNOSIS — Z98.890: ICD-10-CM

## 2020-07-28 PROCEDURE — 99284 EMERGENCY DEPT VISIT MOD MDM: CPT

## 2020-07-28 PROCEDURE — 93971 EXTREMITY STUDY: CPT

## 2020-07-28 PROCEDURE — 73610 X-RAY EXAM OF ANKLE: CPT

## 2020-07-28 PROCEDURE — 73630 X-RAY EXAM OF FOOT: CPT

## 2020-07-28 NOTE — RAD
Left foot 3 views, left ankle 3 views.

 

HISTORY: Pain, no known injury

 

Left foot

 

3 views were taken the left foot. There is not evidence of an acute 

fracture or acute osseous abnormality. There is diffuse diffuse soft 

tissue swelling. There is mild degenerative change with spurring at the 

tarsal joints.

 

Left ankle

 

3 views were taken of the left ankle there is irregularity at the tip of 

the medial malleolus from a nondisplaced fracture or old ununited injury. 

I do not have an old study for comparison. There is also a sesamoid at the

tip of the medial malleolus. No other evidence of an acute ankle fracture 

is noted. There is diffuse soft tissue swelling. There is spurring on the 

calcaneus.

 

IMPRESSION:

1. Diffuse soft tissue swelling.

2. No fracture or acute osseous abnormality of the left foot.

3. Irregularity at the tip of the medial malleolus which could be a acute 

nondisplaced fracture or an old ununited injury.

4. No other acute fracture or osseous abnormality noted.

 

Electronically signed by: Bay Marcus MD (7/28/2020 12:04 PM) [id number]

## 2020-07-28 NOTE — PHYS DOC
Past Medical History


Past Medical History:  Hypertension


Past Surgical History:  Hip Replacement, Other


Additional Past Surgical Histo:  DISC REPLACEMENT,CARDIAC CATH


Smoking Status:  Current Every Day Smoker


Alcohol Use:  None


Drug Use:  None





General Adult


EDM:


Chief Complaint:  LOWER EXTREMITY SWELLING





HPI:


HPI:





Patient is a 55  year old female who presents with a 1 day history of atraumatic

left heel and foot pain.  Patient evidently had a fall in November and this was 

her first week back at work and near the end of her shift she started having 

extreme pain in the left foot and ankle.  Pain is nonradiating and severe in 

nature and worse with ambulation and palpation.  Patient denies any fever or 

cough.  Patient says she is got chronic swelling in her legs is unchanged.  

Patient says she is chronically short of breath as well that is unchanged.  No 

new trauma





Review of Systems:


Review of Systems:


Constitutional:   Denies fever or chills. []


Eyes:   Denies change in visual acuity. []


HENT:   Denies nasal congestion or sore throat. [] 


Respiratory:   Denies cough complains of chronic shortness of breath


Cardiovascular:   Denies chest pain but complains of chronic edema to the legs


GI:   Denies abdominal pain, nausea, vomiting, bloody stools or diarrhea. [] 


:  Denies dysuria. [] 


Musculoskeletal:   Denies back pain complains of left ankle pain


Integument:   Denies rash. [] 


Neurologic:   Denies headache, focal weakness or sensory changes. [] 


Endocrine:   Denies polyuria or polydipsia. [] 


Lymphatic:  Denies swollen glands. [] 


Psychiatric:  Denies depression or anxiety. []





Heart Score:


Risk Factors:


Risk Factors:  DM, Current or recent (<one month) smoker, HTN, HLP, family 

history of CAD, obesity.


Risk Scores:


Score 0 - 3:  2.5% MACE over next 6 weeks - Discharge Home


Score 4 - 6:  20.3% MACE over next 6 weeks - Admit for Clinical Observation


Score 7 - 10:  72.7% MACE over next 6 weeks - Early Invasive Strategies





Allergies:


Allergies:





Allergies








Coded Allergies Type Severity Reaction Last Updated Verified


 


  No Known Drug Allergies    19 No











Physical Exam:


PE:





Constitutional: Well developed, well nourished, no acute distress, non-toxic 

appearance. []


HENT: Normocephalic, atraumatic, bilateral external ears normal, no trismus nose

 normal. []


Eyes: PERRLA, EOMI, conjunctiva normal, no discharge. [] 


Neck: Normal range of motion, no tenderness, supple, no stridor. [] 


Cardiovascular:Heart rate regular rhythm, peripheral pulses intact, cap refill 

brisk


Lungs & Thorax: No respiratory distress


Abdomen: Bowel sounds normal, soft, no tenderness, no masses, no pulsatile 

masses. [] 


Skin: Warm, dry, no erythema, no rash. [] 


Back: No tenderness, no CVA tenderness. [] 


Extremities: Chronic 2+ bilateral lower extremity edema.  Moderate tenderness to

 palpation left posterior foot and ankle.  Normal Diaz test.  No erythema, 

warmth, mild swelling that is similar to the right side.  Neurovascular intact 

distally.  Peripheral pulses intact


Neurologic: Alert and oriented X 3, normal motor function, normal sensory 

function, no focal deficits noted. []


Psychologic: Affect normal, judgement normal, mood normal. []





Current Patient Data:


Vital Signs:





Vital Signs








  Date Time  Temp Pulse Resp B/P (MAP) Pulse Ox O2 Delivery O2 Flow Rate FiO2


 


20 14:00  78 18 179/88 (118) 98 Room Air  


 


20 12:35      Room Air  


 


20 11:40 98.0 67 26 182/83 (116) 97 Room Air  





 98.0       











EKG:


EKG:


[]





Radiology/Procedures:


Radiology/Procedures:


[][facility]


                    [address] [place] [address]


                                 [phone]


                                        


                                 IMAGING REPORT





                                     Signed





PATIENT: DARA MENA  ACCOUNT: [id number]     MRN#: [id number]


: 1965           LOCATION: ER              AGE: 55


SEX: F                    EXAM DT: 20         ACCESSION#: [id number]


STATUS: REG ER            ORD. PHYSICIAN: ANNE MARIE LANDA MD


REASON: PAIN, STARTED HURTING LAST NIGHT AT WORK, NO KNOWN INJURY


PROCEDURE: ANKLE LEFT 3V





Left foot 3 views, left ankle 3 views.


 


HISTORY: Pain, no known injury


 


Left foot


 


3 views were taken the left foot. There is not evidence of an acute 


fracture or acute osseous abnormality. There is diffuse diffuse soft 


tissue swelling. There is mild degenerative change with spurring at the 


tarsal joints.


 


Left ankle


 


3 views were taken of the left ankle there is irregularity at the tip of 


the medial malleolus from a nondisplaced fracture or old ununited injury. 


I do not have an old study for comparison. There is also a sesamoid at the


tip of the medial malleolus. No other evidence of an acute ankle fracture 


is noted. There is diffuse soft tissue swelling. There is spurring on the 


calcaneus.


 


IMPRESSION:


1. Diffuse soft tissue swelling.


2. No fracture or acute osseous abnormality of the left foot.


3. Irregularity at the tip of the medial malleolus which could be a acute 


nondisplaced fracture or an old ununited injury.


4. No other acute fracture or osseous abnormality noted.


 


Electronically signed by: Bay Marcus MD (2020 12:04 PM) [id number]














DICTATED and SIGNED BY:     BAY MARCUS MD


DATE:     20 1204








                            [facility]


                    [address] [place] [address]


                                 [phone]


                                        


                                 IMAGING REPORT





                                     Signed





PATIENT: DARA MENA  ACCOUNT: [id number]     MRN#: [id number]


: 1965           LOCATION: ER              AGE: 55


SEX: F                    EXAM DT: 20         ACCESSION#: [id number]


STATUS: REG ER            ORD. PHYSICIAN: ANNE MARIE LANDA MD


REASON: PAIN


PROCEDURE: VENOUS LOWER EXTREMITY LEFT





Left lower extremity venous real time grayscale, color and spectral duplex


ultrasound was performed. 


 


History: Reason: PAIN / Spl. Instructions:  / History: 


 


Comparison: None


 


The left common femoral, femoral, and popliteal veins   demonstrate 


anechoic lumina, full compressibility, augmentable waveforms, and cephalad


color Doppler flow.  The posterior tibial  are also patent. Normal flow is


also seen in the cephalad portion of the saphenous vein.


 


Impression: No evidence of DVT in the left lower extremity. 


 


Electronically signed by: Alec Lopez MD (2020 12:19 PM) [id number]














DICTATED and SIGNED BY:     ALEC LOPEZ MD


DATE:     20 1219





Course & Med Decision Making:


Course & Med Decision Making


Pertinent Labs and Imaging studies reviewed. (See chart for details)





[] 55-year-old female presents with left foot and ankle pain.  No erythema or 

warmth to suggest septic arthritis.  There is a possible avulsion to the medial 

malleolus.  Patient has been ordered a walker boot.  Patient stable for 

discharge outpatient follow-up.  With a orthopedic surgeon.





Sheba Disclaimer:


Dragon Disclaimer:


This electronic medical record was generated, in whole or in part, using a voice

 recognition dictation system.





Departure


Departure


Impression:  


   Primary Impression:  


   Fracture of medial malleolus, left, closed


   Additional Impression:  


   Left leg pain


Disposition:  01 HOME, SELF-CARE


Condition:  STABLE


Referrals:  


UNKNOWN PCP NAME (PCP)








ADAM DIEHL II, MD


2-3 DAYS


Patient Instructions:  Ankle Fracture





Additional Instructions:  


EMERGENCY DEPARTMENT GENERAL DISCHARGE INSTRUCTIONS





THANK YOU for coming to [facility] Emergency Department (ED) 

today and 


trusting us with your care.  We trust that you had a positive experience in our 

Emergency 


Department. If you wish to speak to the department Management you can contact 

the department 


Director at [phone].








YOUR FOLLOW UP INSTRUCTIONS ARE AS FOLLOWS: 





Do you have a private doctor? If you do not have a private doctor, please ask 

for a resource 


list of physicians or clinics that may be able to assist you with follow up 

care.  





The Emergency Physician has interpreted your x-rays. The X-ray specialist will 

also review 


them. If there is a change in the findings you will be notified in 48 hours when

 at all 


possible. 





A lab test or lab culture may have been done, your results will be reviewed and 

you will be 


notified if you need a change in treatment. 








ADDITIONAL INSTRUCTIONS AND INFORMATION 





Your care today has been supervised by a physician who is specially trained in 

emergency 


care. Many problems require more than one evaluation for a complete diagnosis 

and treatment. 


We recommend that you schedule your follow up appointment as recommended to 

ensure complete 


treatment of your illness or injury.  If you are unable to obtain follow up care

 and 


continue to have a problem, or if your condition worsens we recommend that you 

return to the 


ED. 





We are not able to safely determine your condition over the phone nor are we 

able to give 


sound medical advice over the phone. For these safety reasons, if you call for 

medical 


advice we will ask you to come to the ED for further evaluation





If you have any questions regarding these discharge instructions please call the

 ED at [phone].





SAFETY INFORMATION





In the interest of safety, wellness, and injury prevention; we encourage you to 

wear your 


seatbelt, if you smoke; quit smoking, and we encourage your family to use 

protective helmet 


for bicycling and other sporting events that present an increased risk for head 

injury. 





IF YOUR SYMPTOMS WORSEN OR NEW SYMPTOMS DEVELOP, OR YOU HAVE CONCERNS ABOUT YOUR

 CONDITION; 


OR IF YOUR CONDITION WORSENS WHILE YOU ARE WAITING FOR YOUR FOLLOW UP A

PPOINTMENT;  EITHER  


CONTACT YOUR PRIMARY CARE DOCTOR, THE PHYSICIAN WHOSE NAME AND NUMBER YOU WERE 

GIVEN,  OR 


RETURN TO THE  ED IMMEDIATELY.


Scripts


Hydrocodone/Apap 7.5-325 (NORCO 7.5-325 TABLET) 1 Each Tablet


1 TAB PO PRN Q6HRS PRN for PAIN for 4 Days, #15 TAB 0 Refills


   Prov: ANNE MARIE LANDA MD         20





Justicifation of Admission Dx:


Justifications for Admission:


Justification of Admission Dx:  N/A











ANNE MARIE LANDA MD              2020 11:39

## 2020-07-28 NOTE — RAD
Left lower extremity venous real time grayscale, color and spectral duplex

ultrasound was performed. 

 

History: Reason: PAIN / Spl. Instructions:  / History: 

 

Comparison: None

 

The left common femoral, femoral, and popliteal veins   demonstrate 

anechoic lumina, full compressibility, augmentable waveforms, and cephalad

color Doppler flow.  The posterior tibial  are also patent. Normal flow is

also seen in the cephalad portion of the saphenous vein.

 

Impression: No evidence of DVT in the left lower extremity. 

 

Electronically signed by: Alec Lopez MD (7/28/2020 12:19 PM) [id number]

## 2020-09-10 ENCOUNTER — HOSPITAL ENCOUNTER (OUTPATIENT)
Dept: HOSPITAL 61 - KCIC MRI | Age: 55
Discharge: HOME | End: 2020-09-10
Attending: ORTHOPAEDIC SURGERY
Payer: COMMERCIAL

## 2020-09-10 DIAGNOSIS — M77.32: ICD-10-CM

## 2020-09-10 DIAGNOSIS — M19.072: Primary | ICD-10-CM

## 2020-09-10 PROCEDURE — 73721 MRI JNT OF LWR EXTRE W/O DYE: CPT

## 2020-09-10 NOTE — KCIC
Study: MRI left ankle without contrast

 

INDICATION: Left ankle pain. Chronic swelling.

 

COMPARISON: Left ankle radiographs 8/11/2020.

 

TECHNIQUE: Multiplanar MR imaging of the left ankle performed without the 

use of intravenous or intra-articular contrast.

 

FINDINGS:

 

Bones/cartilage: Achilles insertion enthesophyte formation and a small 

plantar calcaneal spur. Prominent os trigonum which is slightly edematous 

as is the adjacent posterior talar body. Calcaneal edema and cystic change

along the angle of Gissane. Trace talar edema at this location. 

Plantar/medial flexion of the distal talus. Scattered midfoot arthrosis 

which is relatively mild. Chronic osseous fragments at the tip of the 

medial malleolus. No full-thickness chondral defect seen at the ankle.

 

Ligaments: Sequela of prior injury involving the anterior/inferior 

tibiofibular ligament. The PITFL is intact. The ATFL is intact. The CFL 

appears intact but is deformed due to interposition between a valgus 

deviated calcaneus and somewhat low-lying fibula. The PTFL is 

heterogeneous but intact. No findings of acute injury to the deep or 

superficial deltoid ligaments. The spring ligament is thinned but appears 

intact.

 

Musculotendinous: Intact and normally located peroneal tendons. No tear or

advanced tendinosis of the flexor, extensor and Achilles tendons. The PTT 

appears to exhibit an early bifurcation occurring proximal to the 

navicular insertion. Mild abductor digiti minimi fatty infiltration.

 

Sinus Tarsi: Mostly absent expected sinus tarsi fat.

 

Tarsal tunnel: No space-occupying mass.

 

Plantar fascia: No findings of active plantar fasciitis.

 

Miscellaneous: Diffuse subcutaneous edema throughout the foot and ankle 

and extending beyond the obtained field-of-view. No large joint effusion.

 

IMPRESSION:

1.  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

2.  Noting the absence of weightbearing, plantar/medial deviation of the 

talus and hindfoot valgus. Additionally there are marrow signal changes 

adjacent to the sinus tarsi which are non-specific but can be seen with 

lateral hindfoot impingement, subtalar instability and/or reactive to the 

abnormal sinus tarsi. No full-thickness disruption of the spring ligament 

or PTT tear/advanced tendinosis to account for the deviation of the talus.

3.  Prominent os trigonum with marrow edema. Recommend correlation for 

symptoms of os trigonum syndrome.

4.  Marked extent of generalized subcutaneous edema.

5.  Additional chronic observations as detailed in the body the report.

 

Electronically signed by: JONNY GREEN MD (9/10/2020 12:54 PM) 

[id number]

## 2020-11-03 ENCOUNTER — HOSPITAL ENCOUNTER (OUTPATIENT)
Dept: HOSPITAL 61 - KCIC MRI | Age: 55
End: 2020-11-03
Attending: NEUROLOGICAL SURGERY
Payer: COMMERCIAL

## 2020-11-03 DIAGNOSIS — M48.061: ICD-10-CM

## 2020-11-03 DIAGNOSIS — M43.16: ICD-10-CM

## 2020-11-03 DIAGNOSIS — M47.27: Primary | ICD-10-CM

## 2020-11-03 PROCEDURE — 72148 MRI LUMBAR SPINE W/O DYE: CPT

## 2021-01-07 ENCOUNTER — HOSPITAL ENCOUNTER (OUTPATIENT)
Dept: HOSPITAL 61 - US | Age: 56
End: 2021-01-07
Attending: INTERNAL MEDICINE
Payer: COMMERCIAL

## 2021-01-07 DIAGNOSIS — R60.0: Primary | ICD-10-CM

## 2021-01-07 PROCEDURE — 93970 EXTREMITY STUDY: CPT

## 2021-01-07 NOTE — RAD
MR#: [id number]

Account#: [id number]

Accession#: [id number]

Date of Study: 01/07/2021

Ordering Physician: CODY JUAREZ, 

Referring Physician: CODY JUAREZ, 

Tech: Elvin Johnson MBA, RDMS, RVT, RDCS, RTR





--------------- APPROVED REPORT --------------





Patient Location : OUT-PATIENT



Indications

Lower Extremity Edema :     Bilateral



Findings

The right great saphenous vein measures 6.5 mm and the left great saphenous vein measures 6.8 mm.



Bilateral greater and lesser saphenous veins did not show any evidence of reflux



Critical Notification

Critical Value: No



<Conclusion>

1.  No evidence of bilateral lower extremity reflux.



Signed by : Hira Perdomo, 

Electronically Approved : 01/07/2021 10:48:53

## 2021-11-18 ENCOUNTER — HOSPITAL ENCOUNTER (OUTPATIENT)
Dept: HOSPITAL 61 - PNCL | Age: 56
Discharge: HOME | End: 2021-11-18
Attending: ANESTHESIOLOGY
Payer: COMMERCIAL

## 2021-11-18 DIAGNOSIS — F41.9: ICD-10-CM

## 2021-11-18 DIAGNOSIS — Z79.899: ICD-10-CM

## 2021-11-18 DIAGNOSIS — M51.17: Primary | ICD-10-CM

## 2021-11-18 DIAGNOSIS — M19.90: ICD-10-CM

## 2021-11-18 DIAGNOSIS — Z83.3: ICD-10-CM

## 2021-11-18 DIAGNOSIS — F32.9: ICD-10-CM

## 2021-11-18 DIAGNOSIS — F17.210: ICD-10-CM

## 2021-11-18 DIAGNOSIS — M47.27: ICD-10-CM

## 2021-11-18 DIAGNOSIS — Z98.890: ICD-10-CM

## 2021-11-18 DIAGNOSIS — E78.00: ICD-10-CM

## 2021-11-18 DIAGNOSIS — G47.30: ICD-10-CM

## 2021-11-18 DIAGNOSIS — E66.9: ICD-10-CM

## 2021-11-18 DIAGNOSIS — Z79.84: ICD-10-CM

## 2021-11-18 DIAGNOSIS — Z82.49: ICD-10-CM

## 2021-11-18 DIAGNOSIS — I10: ICD-10-CM

## 2021-11-18 PROCEDURE — 62323 NJX INTERLAMINAR LMBR/SAC: CPT

## 2021-11-18 NOTE — PDOC1
INITIAL PAIN CONSULT


DATE OF SERVICE:


DOS:


DATE: 11/18/21 


TIME: 12:42





CHIEF COMPLAINT:


Chief Complaint:


Low back and left lower extremity pain





HISTORY OF PRESENT ILLNESS:


56-year-old female presents with history of pain in the low back left lower 

extremity primarily in the left lower extremity more than the low back but 

occasionally in the back as well for several years patient reports the pain 

began in July of last summer 2020 gently but not the result of any specific 

injury or accident that she is aware of.  Patient reports numbness in both feet 

especially on the left side traveling down the leg mostly the posterior gluteus 

posterior thigh posterior calf again intermittently in the leg but always 

present in the left foot much worse with weightbearing standing walking better 

with sitting or laying down but awakens her from sleep least 3-4 times at night 

on the left leg patient reports some pain in the right leg is in foot as well 

but very minimal compared to the left patient reports numbness in the left hand 

also patient reports the pain in the back and leg on the left side is worse with

walking does not affect her ability to walk and she is using a cane in her right

hand and she has it with her today.  Patient has had physical therapy in the 

past also is doing stretching strengthening on her own especially can and has 

had epidural steroid injection at outside facility approximately a year ago 

which did help significantly patient reports he taking gabapentin she is tried 

pregabalin as well of these of the making her nauseous he has decreased the dose

on her gabapentin from her neurologist currently.  Patient rates her disability 

rating 0-10 10 being the worst is a 6 with family home responsibilities 7 with 

recreation 9 with social activity 10 with occupational activities 5 with 

self-care and 5 light support activities.  Describes pain as sharp and stabbing 

throbbing in the back and leg changes during the day worse with walking standing

tingling in the foot as well as aching in the back and burning in the leg and 

foot on the left side.  Patient reports no bowel or bladder incontinence no loss

of motor function completely but significant fatigability of the left leg with 

weightbearing.





PAST MEDICAL HISTORY:


PMH:


Hypertension, shortness of breath arthritis, obesity





PREVIOUS SURGERIES:


Past Surgical Hx:


Right total hip replacement, 2017 and 2019 with anterior cervical discectomy and

fusion





CURRENT MEDICATIONS:


Current Meds:





Active Scripts








 Medications  Dose


 Route/Sig


 Max Daily Dose Days Date Category


 


 Losartan


 Potassium 50 Mg


 Tablet  50 Mg


 PO DAILY


   11/18/21 Reported


 


 Metformin Hcl 500


 Mg Tablet  500 Mg


 PO DAILY


   11/18/21 Reported


 


 Bumetanide 1 Mg


 Tablet  Unknown Dose


 PO BID


   11/18/21 Reported


 


 Lyrica


  (Pregabalin) 50


 Mg Capsule  1 Cap


 PO DAILY


   11/18/21 Reported


 


 Amlodipine


 Besylate 10 Mg


 Tablet  10 Mg


 PO DAILY


  30 4/11/19 Rx


 


 Atorvastatin


 Calcium 10 Mg


 Tablet  10 Mg


 PO QHS


  30 4/11/19 Rx











ALLERGIES;


Allergies:  


Coded Allergies:  


     No Known Drug Allergies (Unverified , 9/30/19)





FAMILY HISTORY:


Family Hx:


Diabetes, hypertension, heart disease





SOCIAL HISTORY:


Social Hx:


Patient is under alcohol does not use any illegal illicit recreational drugs 

does smoke cigarettes less than a pack a day and has for 40 years continues 

smoke he is single lives locally in [facility] has 1 child living at 

home.





REVIEW OF SYSTEMS:


ROS:


Positive for those items mentioned in history of present illness, all systems 

are reviewed, otherwise negative ,and are complete full and well-documented on 

patient's chart.





PHYSICAL EXAM:


VS:


Blood pressure is 205/93 pulse 50 respirations 18 temperature 98.1 F height is 

5 foot 5 inches weight is 273 pounds


PE:


PHYSICAL EXAMINATION:





GENERAL: The patient is awake, alert, oriented, appropriate, very pleasant in 

demeanor


HEENT: Shows normocephalic, atraumatic.  Extraocular movements are intact and 

symmetrical.  Oral cavity: Mucous membranes moist and pink.  Dentition is 

intact.


NECK: Shows anterior throat supple without palpable lymphadenopathy noted.  

Swallow reflex symmetrical.


CHEST: Shows normal on inspection.  Breath sounds are clear bilaterally, distant

but no rales or rhonchi auscultated.


HEART: Shows S1, S2 clear.  No murmurs auscultated.


ABDOMEN: Soft, nontender, nondistended, obese.  No palpable organomegaly is 

noted.  No rebound or guarding demonstrated.


BACK: Shows spine grossly in the midline.  Normal-appearing cervical lordotic 

curvature.  Cervical paraspinous muscles show symmetrical with inspection, 

palpation some moderate tenderness diffusely in the middle and lower 

distribution the paraspinous muscles also in superior medial trapezius but only 

diffusely without significant radiation.  Patient shows full rotation motion 

cervical spine both laterally as well as extension and flexion without 

significant limitation or pain reported.  There is slightly increased thoracic 

kyphosis, some minor flattening of the lumbar lordotic curvature.  Lumbar 

paraspinous muscles show symmetrical on inspection, on palpation shows some 

moderate tenderness diffusely throughout the upper, middle and lower 

distribution of the paraspinous muscles bilaterally and also into the lower 

thoracic paraspinous musculature, firm and tender, without specific trigger 

points, without radiation of pain.  The patient has good rotational motion of 

the lumbar spine, both laterally as well as extension and flexion without 

significant difficulty.  No tenderness over the spinous processes, sacrum or 

sacroiliac regions.


EXTREMITIES: Lower extremities show deep tendon reflexes 1+ in the patellar and 

tendo calcaneus tendons.  Motor exam is 4 on a scale of 5 with right 

dorsiflexion, extension, quadriceps and hamstring flexion and 3/5 on the left.  

Peripheral pulses are 1 posterior tibial.  2+ peripheral edema is noted 

bilaterally in the ankles extending two thirds distance to the knee on the 

anterior tibia bilaterally.  Lower extremities are warm and dry to touch, equal 

in color and appearance.  Straight leg raise noted to be negative, left side is 

mildly positive at 35 to 40 degrees decreased with knee flexion.  Gaenslen's and

Abraham's maneuvers are negative bilaterally.  The patient is able to stand, has

difficulty trying to stand on 1 foot or the other by itself is significant 

difficulty getting up from seated position and uses the arms of the chair is 

using a cane in her right hand has significant antalgic gait favoring the left 

lower extremity as a very slow deliberate gait as well.


SKIN: Shows warm and dry, good turgor.  No edema.  No sores, rashes or bruising 

throughout.





IMPRESSION:


Impression:


56-year-old female with approximate 18-month history and left lower extremity 

pain


MRI scan from October 2020 showing significant spondylosis L4-5 levels with 

multiple level lumbar spondylosis most prominent L4-5 with grade 1 

anterolisthesis L4 on L5 with mild to moderate canal narrowing with severe 

subarticular recess narrowing.


Obesity


Cigarette smoking


Arthritis


Hypertension


Shortness of breath





Plan: After discussed with the patient including conservative management is 

continued physical therapy interventional techniques.  Patient elects 

interventional techniques as she has had good success with these in the past.  

We discussed a lumbar epidural steroid junctions description as well as 

anatomical models described procedure.  Risks were discussed including but not 

limited to: Bleeding, infection, possibility of epidural hematoma and subsequent

neurological compromise, dural puncture, headaches, spinal cord and/or nerve 

damage, side effects of steroid medication, and poor results regarding pain 

control.  Patient understands and wished to proceed.  Patient return to clinic 

in approximate 4 weeks for follow-up, was counseled return appointment, typical,

and side effect to be aware of.





Procedure is lumbar epidural steroid injection under local anesthetic using 

sterile prep and drape at the L5-S1 level using C-arm fluoroscopic guidance in 

both AP and lateral views medications injected is 120 mg Depo-Medrol +10mL 

preservative-free normal saline and 2 mL contrast- condition at discharge is 

stable patient tolerated procedure well had no complications.











TATY ENGEL MD               Nov 18, 2021 12:49

## 2021-11-18 NOTE — PDOC4
Procedure Note:


ICD 10 Code:


ICD 10 Code:


M4 7.816


M4 7.817


M54.17


M51.87





Procedure Note:


Patient was consented for lumbar epidural steroid injection with fluoroscopic 

guidance.  Risks were discussed including but not limited to: Bleeding, 

infection, possibility of epidural hematoma and subsequent neurological compro

mise, dural puncture, headaches, spinal cord and/or nerve damage, side effects 

of steroid medication, and poor results regarding pain control.  Patient 

understands and wished to proceed.


Procedure is lumbar epidural steroid injection under local anesthetic using 

sterile prep and drape at the L5-S1 level using C-arm fluoroscopic guidance in 

both AP and lateral views medications injected is 120 mg Depo-Medrol +10mL 

preservative-free normal saline and 2 mL contrast- condition at discharge is 

stable patient tolerated procedure well had no complications.











TATY ENGEL MD               Nov 18, 2021 12:50

## 2021-12-23 ENCOUNTER — HOSPITAL ENCOUNTER (OUTPATIENT)
Dept: HOSPITAL 61 - PNCL | Age: 56
Discharge: HOME | End: 2021-12-23
Attending: ANESTHESIOLOGY
Payer: COMMERCIAL

## 2021-12-23 DIAGNOSIS — Z82.49: ICD-10-CM

## 2021-12-23 DIAGNOSIS — M48.07: ICD-10-CM

## 2021-12-23 DIAGNOSIS — G47.30: ICD-10-CM

## 2021-12-23 DIAGNOSIS — Z98.890: ICD-10-CM

## 2021-12-23 DIAGNOSIS — I10: ICD-10-CM

## 2021-12-23 DIAGNOSIS — E78.00: ICD-10-CM

## 2021-12-23 DIAGNOSIS — M19.041: ICD-10-CM

## 2021-12-23 DIAGNOSIS — F32.9: ICD-10-CM

## 2021-12-23 DIAGNOSIS — M47.26: ICD-10-CM

## 2021-12-23 DIAGNOSIS — M19.042: ICD-10-CM

## 2021-12-23 DIAGNOSIS — M51.16: Primary | ICD-10-CM

## 2021-12-23 DIAGNOSIS — Z79.899: ICD-10-CM

## 2021-12-23 DIAGNOSIS — F17.210: ICD-10-CM

## 2021-12-23 DIAGNOSIS — E66.9: ICD-10-CM

## 2021-12-23 DIAGNOSIS — Z79.84: ICD-10-CM

## 2021-12-23 DIAGNOSIS — F41.9: ICD-10-CM

## 2021-12-23 PROCEDURE — 62323 NJX INTERLAMINAR LMBR/SAC: CPT

## 2021-12-23 NOTE — PDOC
Progress Note - Pain Clinic


Date of Service:


DOS:


DATE: 12/23/21 


TIME: 12:01





Diagnosis:


Dx:


Lumbar radiculopathy lumbar degenerative disease and lumbar spondylosis


Osteoarthritis bilateral hands





History or Present Illness:


HPI:


56-year-old female returns for follow-up status post lumbar epidural steroid 

injection x1.  Patient report about 80% improvement after the first injection 

for 1-1/2 months patient reports pain returning now in the low back and in the 

left lower extremity posterior gluteus posterior thigh patient reports is worse 

with walking standing changing positions Tribes aching sharp dull tight shooting

cramping at times stabbing and burning and can be severe when she first gets up 

or when she is been walking quite a bit patient reports prior to that though she

is doing much better with walking doing household activities try with greater 

ease and comfort sleeping much better at night is waking her from sleep about 

once a night now in the low back the left leg.  Patient also has complaint of 

bilateral arthritic pain in the hands and knuckles especially with repetitive 

use and certainly first in the morning where it feels stiff and tight and 

swollen patient is taking over-the-counter analgesics such as Motrin and Tylenol

which helped but only by about 25%.  Patient rates her back pain is a 6 with is 

worst 5 on average 3 at its least is a 5 today.  Patient reports no bowel or 

bladder incontinence.  Patient reports significant tingling and numbness in the 

left foot as well.





Physical Exam:


VS:


Blood pressure is 202/86 pulse 64 respirations 18 temperature 98.1 F height 5 

feet 5 inches weight is 271 pounds.


PE:


PHYSICAL EXAMINATION:





GENERAL: The patient is awake, alert, oriented, appropriate, very pleasant in 

demeanor


HEENT: Shows normocephalic, atraumatic.  Extraocular movements are intact and 

symmetrical.  Oral cavity: Mucous membranes moist and pink.  Dentition is 

intact.


NECK: Shows anterior throat supple without palpable lymphadenopathy noted.  

Swallow reflex symmetrical.


CHEST: Shows normal on inspection.  Breath sounds are clear bilaterally, distant

no rales or rhonchi.


HEART: Shows S1, S2 clear.  No murmurs auscultated.


ABDOMEN: Soft, nontender, nondistended, obese.  No palpable organomegaly is 

noted. 


BACK: Shows spine grossly in the midline.  Normal-appearing cervical lordotic 

curvature.  There is slightly increased thoracic kyphosis, some minor flattening

of the lumbar lordotic curvature.  Lumbar paraspinous muscles show symmetrical 

on inspection, on palpation shows some moderate tenderness diffusely throughout 

the upper, middle and lower distribution of the paraspinous muscles, but without

specific trigger points, without radiation of pain.  The patient has good 

rotational motion of the lumbar spine, both laterally as well as extension and 

flexion without significant difficulty.  


EXTREMITIES: Lower extremities show deep tendon reflexes 1+ in the patellar and 

tendo calcaneus tendons.  Motor exam is 5 on a scale of 5 with right 

dorsiflexion, extension, quadriceps and hamstring flexion and 4/5 on the left.  

Peripheral pulses are 1+ posterior tibial.  No peripheral edema is noted bilate

rally.  Lower extremities are warm and dry to touch, equal in color and 

appearance.  Patient upper extremity show deep tendon reflexes 2+ in the bicep 

tricep tendons motor exam is strong with  strength rated at 4-5 but equal 

and symmetrical patient has moderate tenderness over the medic carpal phalangeal

joints bilaterally especially the first joint and index finger on both hands 

with some minor appearance of swelling compared to the other knuckles 

bilaterally.


SKIN: Shows warm and dry, good turgor.  No edema.  No sores, rashes or bruising 

throughout.





Procedure:


Procedure:


Options were discussed with the patient.  Patient chart reviews her current 

medication regimen updated current review of systems updated today as well.  We 

will proceed with a lumbar epidural steroid injection today with fluoroscopic 

guidance.  Risks were discussed including but not limited to: Bleeding, 

infection, possibility of epidural hematoma and subsequent neurological 

compromise, dural puncture, headaches, spinal cord and/or nerve damage, side 

effects of steroid medication, and poor results regarding pain control.  Patient

understands and wished to proceed.  Patient return to clinic in approximately 2 

weeks for follow-up, was counseled as return appointment, activity level, and 

side effect to be aware of.


Also, will start patient on meloxicam 15 mg 1 tablet daily patient was given 

instructions well side effects aware with the medication.





Medication Injected:


Med Injected:


Procedure is lumbar epidural steroid injection under local anesthetic using 

sterile prep and drape at the L5-S1 level using C-arm fluoroscopic guidance in 

both AP and lateral views medications injected is 120 mg Depo-Medrol +10mL 

preservative-free normal saline and 2 mL contrast- condition at discharge is 

stable patient tolerated procedure well had no complications.





Condition at Discharge:


Condition at Discharge:


Condition at discharge is stable, patient tolerated the procedure well and had 

no complications.











TATY ENGEL MD               Dec 23, 2021 12:06

## 2021-12-23 NOTE — PDOC4
Procedure Note:


ICD 10 Code:


ICD 10 Code:


M54.17


M51.87


M4 8.07





Procedure Note:


Patient was consented for lumbar epidural steroid injection with fluoroscopic 

guidance.  Risks were discussed including but not limited to: Bleeding, 

infection, possibility of epidural hematoma and subsequent neurological 

compromise, dural puncture, headaches, spinal cord and/or nerve damage, side 

effects of steroid medication, and poor results regarding pain control.  Patient

understands and wished to proceed.


Procedure is lumbar epidural steroid injection under local anesthetic using brodie

rile prep and drape at the L5-S1 level using C-arm fluoroscopic guidance in both

AP and lateral views medications injected is 120 mg Depo-Medrol +10mL 

preservative-free normal saline and 2 mL contrast- condition at discharge is 

stable patient tolerated procedure well had no complications.











TATY ENGEL MD               Dec 23, 2021 12:07

## 2022-01-27 ENCOUNTER — HOSPITAL ENCOUNTER (OUTPATIENT)
Dept: HOSPITAL 61 - PNCL | Age: 57
Discharge: HOME | End: 2022-01-27
Attending: ANESTHESIOLOGY
Payer: MEDICARE

## 2022-01-27 DIAGNOSIS — Z88.8: ICD-10-CM

## 2022-01-27 DIAGNOSIS — Z79.84: ICD-10-CM

## 2022-01-27 DIAGNOSIS — G47.30: ICD-10-CM

## 2022-01-27 DIAGNOSIS — I10: ICD-10-CM

## 2022-01-27 DIAGNOSIS — Z79.899: ICD-10-CM

## 2022-01-27 DIAGNOSIS — M19.90: ICD-10-CM

## 2022-01-27 DIAGNOSIS — Z82.49: ICD-10-CM

## 2022-01-27 DIAGNOSIS — E66.9: ICD-10-CM

## 2022-01-27 DIAGNOSIS — F32.9: ICD-10-CM

## 2022-01-27 DIAGNOSIS — M47.26: ICD-10-CM

## 2022-01-27 DIAGNOSIS — Z98.890: ICD-10-CM

## 2022-01-27 DIAGNOSIS — M51.16: Primary | ICD-10-CM

## 2022-01-27 DIAGNOSIS — E78.00: ICD-10-CM

## 2022-01-27 DIAGNOSIS — F17.210: ICD-10-CM

## 2022-01-27 DIAGNOSIS — F41.9: ICD-10-CM

## 2022-01-27 PROCEDURE — 62323 NJX INTERLAMINAR LMBR/SAC: CPT

## 2022-01-27 NOTE — PDOC4
Procedure Note:


ICD 10 Code:


ICD 10 Code:


M54.17


m51.87


M4 7.817





Procedure Note:


Patient was consented for lumbar epidural steroid injection with fluoroscopic 

guidance.  Risks were discussed including but not limited to: Bleeding, 

infection, possibility of epidural hematoma and subsequent neurological 

compromise, dural puncture, headaches, spinal cord and/or nerve damage, side 

effects of steroid medication, and poor results regarding pain control.  Patient

understands and wished to proceed.


Procedure is lumbar epidural steroid injection under local anesthetic using st

erile prep and drape at the L5-S1 level using C-arm fluoroscopic guidance in 

both AP and lateral views medications injected is 120 mg Depo-Medrol +10mL 

preservative-free normal saline and 2 mL contrast- condition at discharge is 

stable patient tolerated procedure well had no complications.











TATY ENGEL MD               Jan 27, 2022 11:08